# Patient Record
Sex: MALE | URBAN - METROPOLITAN AREA
[De-identification: names, ages, dates, MRNs, and addresses within clinical notes are randomized per-mention and may not be internally consistent; named-entity substitution may affect disease eponyms.]

---

## 2017-07-28 ENCOUNTER — OUTPATIENT (OUTPATIENT)
Dept: OUTPATIENT SERVICES | Facility: HOSPITAL | Age: 57
LOS: 1 days | Discharge: HOME | End: 2017-07-28
Payer: COMMERCIAL

## 2017-07-28 VITALS
HEIGHT: 74 IN | SYSTOLIC BLOOD PRESSURE: 150 MMHG | TEMPERATURE: 98 F | DIASTOLIC BLOOD PRESSURE: 92 MMHG | HEART RATE: 68 BPM | RESPIRATION RATE: 15 BRPM | OXYGEN SATURATION: 100 % | WEIGHT: 276.02 LBS

## 2017-07-28 DIAGNOSIS — Z98.890 OTHER SPECIFIED POSTPROCEDURAL STATES: Chronic | ICD-10-CM

## 2017-07-28 DIAGNOSIS — Z98.1 ARTHRODESIS STATUS: Chronic | ICD-10-CM

## 2017-07-28 DIAGNOSIS — M25.461 EFFUSION, RIGHT KNEE: ICD-10-CM

## 2017-07-28 LAB
ANION GAP SERPL CALC-SCNC: 8 MMOL/L — SIGNIFICANT CHANGE UP (ref 5–17)
APPEARANCE UR: CLEAR — SIGNIFICANT CHANGE UP
APTT BLD: 31.1 SEC — SIGNIFICANT CHANGE UP (ref 27.5–37.4)
BASOPHILS # BLD AUTO: 0 K/UL — SIGNIFICANT CHANGE UP (ref 0–0.2)
BASOPHILS NFR BLD AUTO: 0.7 % — SIGNIFICANT CHANGE UP (ref 0–2)
BILIRUB UR-MCNC: NEGATIVE — SIGNIFICANT CHANGE UP
BUN SERPL-MCNC: 17 MG/DL — SIGNIFICANT CHANGE UP (ref 7–23)
CALCIUM SERPL-MCNC: 9.2 MG/DL — SIGNIFICANT CHANGE UP (ref 8.5–10.1)
CHLORIDE SERPL-SCNC: 108 MMOL/L — SIGNIFICANT CHANGE UP (ref 96–108)
CO2 SERPL-SCNC: 26 MMOL/L — SIGNIFICANT CHANGE UP (ref 22–31)
COLOR SPEC: YELLOW — SIGNIFICANT CHANGE UP
CREAT SERPL-MCNC: 1.06 MG/DL — SIGNIFICANT CHANGE UP (ref 0.5–1.3)
DIFF PNL FLD: NEGATIVE — SIGNIFICANT CHANGE UP
EOSINOPHIL # BLD AUTO: 0.2 K/UL — SIGNIFICANT CHANGE UP (ref 0–0.5)
EOSINOPHIL NFR BLD AUTO: 2.7 % — SIGNIFICANT CHANGE UP (ref 0–6)
GLUCOSE SERPL-MCNC: 103 MG/DL — HIGH (ref 70–99)
GLUCOSE UR QL: NEGATIVE MG/DL — SIGNIFICANT CHANGE UP
HCT VFR BLD CALC: 39 % — SIGNIFICANT CHANGE UP (ref 39–50)
HGB BLD-MCNC: 13.1 G/DL — SIGNIFICANT CHANGE UP (ref 13–17)
INR BLD: 1.02 RATIO — SIGNIFICANT CHANGE UP (ref 0.88–1.16)
KETONES UR-MCNC: NEGATIVE — SIGNIFICANT CHANGE UP
LEUKOCYTE ESTERASE UR-ACNC: NEGATIVE — SIGNIFICANT CHANGE UP
LYMPHOCYTES # BLD AUTO: 1.8 K/UL — SIGNIFICANT CHANGE UP (ref 1–3.3)
LYMPHOCYTES # BLD AUTO: 28.1 % — SIGNIFICANT CHANGE UP (ref 13–44)
MCHC RBC-ENTMCNC: 28.3 PG — SIGNIFICANT CHANGE UP (ref 27–34)
MCHC RBC-ENTMCNC: 33.6 GM/DL — SIGNIFICANT CHANGE UP (ref 32–36)
MCV RBC AUTO: 84.2 FL — SIGNIFICANT CHANGE UP (ref 80–100)
MONOCYTES # BLD AUTO: 0.5 K/UL — SIGNIFICANT CHANGE UP (ref 0–0.9)
MONOCYTES NFR BLD AUTO: 7.7 % — SIGNIFICANT CHANGE UP (ref 2–14)
NEUTROPHILS # BLD AUTO: 3.8 K/UL — SIGNIFICANT CHANGE UP (ref 1.8–7.4)
NEUTROPHILS NFR BLD AUTO: 60.8 % — SIGNIFICANT CHANGE UP (ref 43–77)
NITRITE UR-MCNC: NEGATIVE — SIGNIFICANT CHANGE UP
PH UR: 5 — SIGNIFICANT CHANGE UP (ref 5–8)
PLATELET # BLD AUTO: 158 K/UL — SIGNIFICANT CHANGE UP (ref 150–400)
POTASSIUM SERPL-MCNC: 3.9 MMOL/L — SIGNIFICANT CHANGE UP (ref 3.5–5.3)
POTASSIUM SERPL-SCNC: 3.9 MMOL/L — SIGNIFICANT CHANGE UP (ref 3.5–5.3)
PROT UR-MCNC: NEGATIVE MG/DL — SIGNIFICANT CHANGE UP
PROTHROM AB SERPL-ACNC: 11 SEC — SIGNIFICANT CHANGE UP (ref 9.8–12.7)
RBC # BLD: 4.63 M/UL — SIGNIFICANT CHANGE UP (ref 4.2–5.8)
RBC # FLD: 13 % — SIGNIFICANT CHANGE UP (ref 10.3–14.5)
SODIUM SERPL-SCNC: 142 MMOL/L — SIGNIFICANT CHANGE UP (ref 135–145)
SP GR SPEC: 1.02 — SIGNIFICANT CHANGE UP (ref 1.01–1.02)
UROBILINOGEN FLD QL: NEGATIVE MG/DL — SIGNIFICANT CHANGE UP
WBC # BLD: 6.3 K/UL — SIGNIFICANT CHANGE UP (ref 3.8–10.5)
WBC # FLD AUTO: 6.3 K/UL — SIGNIFICANT CHANGE UP (ref 3.8–10.5)

## 2017-07-28 PROCEDURE — 93010 ELECTROCARDIOGRAM REPORT: CPT

## 2017-07-28 NOTE — H&P PST ADULT - FAMILY HISTORY
Father  Still living? No  Family history of dementia, Age at diagnosis: Age Unknown     Mother  Still living? Yes, Estimated age: 71-80  Family history of diabetes mellitus, Age at diagnosis: Age Unknown

## 2017-07-28 NOTE — H&P PST ADULT - ASSESSMENT
55 yo male scheduled for  right knee arthroscopy with Dr. Jorgensen on 8/3/17.     1. Labs as per surgeon  2. EKG  3. Medical clearance with PCP Dr Sanchez to be arranged  4. discussed EZ sponges & day of procedure instructions

## 2017-07-28 NOTE — H&P PST ADULT - PMH
Back pain    Elevated blood pressure reading    History of colon polyps    Knee pain, right    Meniscus tear  right  Obesity

## 2017-07-28 NOTE — H&P PST ADULT - PSH
H/O neck surgery  anterior cervical fusion 2010  S/P colonoscopy    S/P foot surgery, right    S/P lumbar fusion  2013

## 2017-08-02 RX ORDER — OXYCODONE HYDROCHLORIDE 5 MG/1
5 TABLET ORAL EVERY 4 HOURS
Qty: 0 | Refills: 0 | Status: DISCONTINUED | OUTPATIENT
Start: 2017-08-03 | End: 2017-08-03

## 2017-08-02 RX ORDER — FENTANYL CITRATE 50 UG/ML
50 INJECTION INTRAVENOUS
Qty: 0 | Refills: 0 | Status: DISCONTINUED | OUTPATIENT
Start: 2017-08-03 | End: 2017-08-03

## 2017-08-02 RX ORDER — ACETAMINOPHEN 500 MG
975 TABLET ORAL ONCE
Qty: 0 | Refills: 0 | Status: COMPLETED | OUTPATIENT
Start: 2017-08-03 | End: 2017-08-03

## 2017-08-02 RX ORDER — FAMOTIDINE 10 MG/ML
20 INJECTION INTRAVENOUS ONCE
Qty: 0 | Refills: 0 | Status: COMPLETED | OUTPATIENT
Start: 2017-08-03 | End: 2017-08-03

## 2017-08-02 RX ORDER — OXYCODONE HYDROCHLORIDE 5 MG/1
10 TABLET ORAL ONCE
Qty: 0 | Refills: 0 | Status: DISCONTINUED | OUTPATIENT
Start: 2017-08-03 | End: 2017-08-03

## 2017-08-03 ENCOUNTER — OUTPATIENT (OUTPATIENT)
Dept: OUTPATIENT SERVICES | Facility: HOSPITAL | Age: 57
LOS: 1 days | Discharge: HOME | End: 2017-08-03
Payer: COMMERCIAL

## 2017-08-03 ENCOUNTER — RESULT REVIEW (OUTPATIENT)
Age: 57
End: 2017-08-03

## 2017-08-03 VITALS
TEMPERATURE: 97 F | WEIGHT: 270.07 LBS | HEIGHT: 74 IN | HEART RATE: 73 BPM | OXYGEN SATURATION: 97 % | SYSTOLIC BLOOD PRESSURE: 136 MMHG | DIASTOLIC BLOOD PRESSURE: 96 MMHG | RESPIRATION RATE: 16 BRPM

## 2017-08-03 VITALS
DIASTOLIC BLOOD PRESSURE: 88 MMHG | TEMPERATURE: 98 F | HEART RATE: 69 BPM | SYSTOLIC BLOOD PRESSURE: 140 MMHG | OXYGEN SATURATION: 95 % | RESPIRATION RATE: 16 BRPM

## 2017-08-03 DIAGNOSIS — Z98.890 OTHER SPECIFIED POSTPROCEDURAL STATES: Chronic | ICD-10-CM

## 2017-08-03 DIAGNOSIS — Z98.1 ARTHRODESIS STATUS: Chronic | ICD-10-CM

## 2017-08-03 PROCEDURE — 88304 TISSUE EXAM BY PATHOLOGIST: CPT | Mod: 26

## 2017-08-03 RX ORDER — OXYCODONE HYDROCHLORIDE 5 MG/1
1 TABLET ORAL
Qty: 30 | Refills: 0
Start: 2017-08-03

## 2017-08-03 RX ORDER — ASPIRIN/CALCIUM CARB/MAGNESIUM 324 MG
1 TABLET ORAL
Qty: 30 | Refills: 0
Start: 2017-08-03 | End: 2017-09-02

## 2017-08-03 RX ORDER — SODIUM CHLORIDE 9 MG/ML
1000 INJECTION, SOLUTION INTRAVENOUS
Qty: 0 | Refills: 0 | Status: DISCONTINUED | OUTPATIENT
Start: 2017-08-03 | End: 2017-08-03

## 2017-08-03 RX ORDER — ONDANSETRON 8 MG/1
4 TABLET, FILM COATED ORAL ONCE
Qty: 0 | Refills: 0 | Status: DISCONTINUED | OUTPATIENT
Start: 2017-08-03 | End: 2017-08-03

## 2017-08-03 RX ADMIN — FAMOTIDINE 20 MILLIGRAM(S): 10 INJECTION INTRAVENOUS at 11:23

## 2017-08-03 RX ADMIN — Medication 975 MILLIGRAM(S): at 11:23

## 2017-08-03 RX ADMIN — FENTANYL CITRATE 50 MICROGRAM(S): 50 INJECTION INTRAVENOUS at 13:21

## 2017-08-03 RX ADMIN — OXYCODONE HYDROCHLORIDE 5 MILLIGRAM(S): 5 TABLET ORAL at 13:22

## 2017-08-03 RX ADMIN — OXYCODONE HYDROCHLORIDE 10 MILLIGRAM(S): 5 TABLET ORAL at 11:23

## 2017-08-03 RX ADMIN — FENTANYL CITRATE 50 MICROGRAM(S): 50 INJECTION INTRAVENOUS at 13:26

## 2017-08-03 RX ADMIN — FENTANYL CITRATE 50 MICROGRAM(S): 50 INJECTION INTRAVENOUS at 13:07

## 2017-08-03 RX ADMIN — FENTANYL CITRATE 50 MICROGRAM(S): 50 INJECTION INTRAVENOUS at 13:00

## 2017-08-03 NOTE — ASU DISCHARGE PLAN (ADULT/PEDIATRIC). - ACTIVITY LEVEL
no exercise/weight bearing as tolerated/Weight bearing as tolerated Right Lower Extremity with assistive devices as needed/no tub baths/no sports/gym/elevate extremity/no heavy lifting/quiet play

## 2017-08-03 NOTE — BRIEF OPERATIVE NOTE - PROCEDURE
Knee arthroscopy, right  08/03/2017  Right Knee Arthroscopy, Partial Medial Menisectomy, Patellofemoral Chondroplasty  Active  [id number]

## 2017-08-03 NOTE — ASU DISCHARGE PLAN (ADULT/PEDIATRIC). - MEDICATION SUMMARY - MEDICATIONS TO STOP TAKING
I will STOP taking the medications listed below when I get home from the hospital:    oxycodone-acetaminophen 10mg-325mg oral tablet  -- 1 tab(s) by mouth every 6 hours, As Needed

## 2017-08-03 NOTE — ASU DISCHARGE PLAN (ADULT/PEDIATRIC). - MEDICATION SUMMARY - MEDICATIONS TO TAKE
I will START or STAY ON the medications listed below when I get home from the hospital:    Ecotrin 325 mg oral delayed release tablet  -- 1 tab(s) by mouth once a day for 4 weeks for DVT ppx  -- Swallow whole.  Do not crush.  Take with food or milk.    -- Indication: For DVT ppx    acetaminophen-oxyCODONE 325 mg-5 mg oral tablet  -- 1 tab(s) by mouth every 4 hours, As Needed MDD:6 - for severe pain  -- Caution federal law prohibits the transfer of this drug to any person other  than the person for whom it was prescribed.  May cause drowsiness.  Alcohol may intensify this effect.  Use care when operating dangerous machinery.  This prescription cannot be refilled.  This product contains acetaminophen.  Do not use  with any other product containing acetaminophen to prevent possible liver damage.  Using more of this medication than prescribed may cause serious breathing problems.    -- Indication: For Severe pain

## 2017-08-03 NOTE — ASU DISCHARGE PLAN (ADULT/PEDIATRIC). - INSTRUCTIONS
Resume normal diet. Please avoid alcohol when taking pain medication FU in 10-14 days. Please call for appt.

## 2017-08-03 NOTE — ASU DISCHARGE PLAN (ADULT/PEDIATRIC). - NOTIFY
Bleeding that does not stop/Pain not relieved by Medications/Numbness, color, or temperature change to extremity/Fever greater than 101/Swelling that continues/Numbness, tingling

## 2017-08-07 DIAGNOSIS — M25.461 EFFUSION, RIGHT KNEE: ICD-10-CM

## 2017-08-07 DIAGNOSIS — M22.41 CHONDROMALACIA PATELLAE, RIGHT KNEE: ICD-10-CM

## 2017-08-07 DIAGNOSIS — M23.221 DERANGEMENT OF POSTERIOR HORN OF MEDIAL MENISCUS DUE TO OLD TEAR OR INJURY, RIGHT KNEE: ICD-10-CM

## 2017-08-07 DIAGNOSIS — M25.561 PAIN IN RIGHT KNEE: ICD-10-CM

## 2017-08-07 DIAGNOSIS — M79.661 PAIN IN RIGHT LOWER LEG: ICD-10-CM

## 2017-08-07 DIAGNOSIS — M23.8X1 OTHER INTERNAL DERANGEMENTS OF RIGHT KNEE: ICD-10-CM

## 2017-08-07 LAB — SURGICAL PATHOLOGY FINAL REPORT - CH: SIGNIFICANT CHANGE UP

## 2018-07-16 PROBLEM — S83.209A UNSPECIFIED TEAR OF UNSPECIFIED MENISCUS, CURRENT INJURY, UNSPECIFIED KNEE, INITIAL ENCOUNTER: Chronic | Status: ACTIVE | Noted: 2017-07-28

## 2020-06-16 NOTE — H&P PST ADULT - CARDIOVASCULAR
Spoke to patient in regards to thyroid being normal, liver function being somewhat elevated and patient agreed to ultrasound. He will avoid tylenol and alcohol to protect liver.  Patient also has anemia and will check B12 folate levels.    Patient expressed verbal understanding with no further questions.      negative Regular rate & rhythm, normal S1, S2; no murmurs, gallops or rubs; no S3, S4 details…

## 2020-10-08 NOTE — ASU DISCHARGE PLAN (ADULT/PEDIATRIC). - NURSING INSTRUCTIONS
Begin with liquids and light food ( tea, toast, Jello, soups). Advance to what you normally eat. Liquids should taken in adequate amounts today.
Carbon monoxide exposure

## 2020-11-30 ENCOUNTER — NON-APPOINTMENT (OUTPATIENT)
Age: 60
End: 2020-11-30

## 2020-11-30 DIAGNOSIS — Z82.61 FAMILY HISTORY OF ARTHRITIS: ICD-10-CM

## 2020-11-30 DIAGNOSIS — M48.061 SPINAL STENOSIS, LUMBAR REGION WITHOUT NEUROGENIC CLAUDICATION: ICD-10-CM

## 2020-11-30 DIAGNOSIS — M43.16 SPONDYLOLISTHESIS, LUMBAR REGION: ICD-10-CM

## 2020-11-30 DIAGNOSIS — Z87.2 PERSONAL HISTORY OF DISEASES OF THE SKIN AND SUBCUTANEOUS TISSUE: ICD-10-CM

## 2020-11-30 DIAGNOSIS — F41.9 ANXIETY DISORDER, UNSPECIFIED: ICD-10-CM

## 2020-11-30 DIAGNOSIS — Z86.19 PERSONAL HISTORY OF OTHER INFECTIOUS AND PARASITIC DISEASES: ICD-10-CM

## 2020-11-30 DIAGNOSIS — Z87.891 PERSONAL HISTORY OF NICOTINE DEPENDENCE: ICD-10-CM

## 2020-11-30 DIAGNOSIS — Z86.018 PERSONAL HISTORY OF OTHER BENIGN NEOPLASM: ICD-10-CM

## 2020-11-30 DIAGNOSIS — Z81.8 FAMILY HISTORY OF OTHER MENTAL AND BEHAVIORAL DISORDERS: ICD-10-CM

## 2020-11-30 DIAGNOSIS — N43.40 SPERMATOCELE OF EPIDIDYMIS, UNSPECIFIED: ICD-10-CM

## 2020-11-30 DIAGNOSIS — K21.9 GASTRO-ESOPHAGEAL REFLUX DISEASE W/OUT ESOPHAGITIS: ICD-10-CM

## 2020-11-30 DIAGNOSIS — Z82.69 FAMILY HISTORY OF OTHER DISEASES OF THE MUSCULOSKELETAL SYSTEM AND CONNECTIVE TISSUE: ICD-10-CM

## 2020-11-30 DIAGNOSIS — K62.1 RECTAL POLYP: ICD-10-CM

## 2020-11-30 DIAGNOSIS — I83.11 VARICOSE VEINS OF RIGHT LOWER EXTREMITY WITH INFLAMMATION: ICD-10-CM

## 2020-11-30 DIAGNOSIS — Z78.9 OTHER SPECIFIED HEALTH STATUS: ICD-10-CM

## 2020-11-30 DIAGNOSIS — Z83.3 FAMILY HISTORY OF DIABETES MELLITUS: ICD-10-CM

## 2020-11-30 DIAGNOSIS — I83.12 VARICOSE VEINS OF RIGHT LOWER EXTREMITY WITH INFLAMMATION: ICD-10-CM

## 2021-03-15 PROBLEM — R03.0 ELEVATED BLOOD-PRESSURE READING, WITHOUT DIAGNOSIS OF HYPERTENSION: Chronic | Status: ACTIVE | Noted: 2017-07-28

## 2021-03-15 PROBLEM — M25.561 PAIN IN RIGHT KNEE: Chronic | Status: ACTIVE | Noted: 2017-07-28

## 2021-03-15 PROBLEM — M54.9 DORSALGIA, UNSPECIFIED: Chronic | Status: ACTIVE | Noted: 2017-07-28

## 2021-03-15 PROBLEM — Z86.010 PERSONAL HISTORY OF COLONIC POLYPS: Chronic | Status: ACTIVE | Noted: 2017-07-28

## 2021-03-15 PROBLEM — E66.9 OBESITY, UNSPECIFIED: Chronic | Status: ACTIVE | Noted: 2017-07-28

## 2021-03-17 ENCOUNTER — APPOINTMENT (OUTPATIENT)
Dept: INTERNAL MEDICINE | Facility: CLINIC | Age: 61
End: 2021-03-17
Payer: COMMERCIAL

## 2021-03-17 VITALS
SYSTOLIC BLOOD PRESSURE: 120 MMHG | BODY MASS INDEX: 33.37 KG/M2 | WEIGHT: 260 LBS | DIASTOLIC BLOOD PRESSURE: 80 MMHG | HEIGHT: 74 IN

## 2021-03-17 DIAGNOSIS — M51.9 UNSPECIFIED THORACIC, THORACOLUMBAR AND LUMBOSACRAL INTERVERTEBRAL DISC DISORDER: ICD-10-CM

## 2021-03-17 DIAGNOSIS — N40.1 BENIGN PROSTATIC HYPERPLASIA WITH LOWER URINARY TRACT SYMPMS: ICD-10-CM

## 2021-03-17 DIAGNOSIS — R35.0 BENIGN PROSTATIC HYPERPLASIA WITH LOWER URINARY TRACT SYMPMS: ICD-10-CM

## 2021-03-17 PROCEDURE — 36415 COLL VENOUS BLD VENIPUNCTURE: CPT

## 2021-03-17 PROCEDURE — 99396 PREV VISIT EST AGE 40-64: CPT | Mod: 25

## 2021-03-17 PROCEDURE — 99072 ADDL SUPL MATRL&STAF TM PHE: CPT

## 2021-03-17 NOTE — PLAN
[FreeTextEntry1] : His exam is unremarkable except for his weight at 260 pounds.  He states he has lost 20 pounds over the past couple months and is going to continue with a weight loss program.\par Fasting labs including thyroid function, A1c and PSA was sent.  Also asking for Covid antibody test to be done.\par History of ED–Cialis 10 mg #90 was renewed.\par History of lumbar disc disease with spasm continues to follow-up with his pain specialist.\par History of intermittent cervical spasm–he has used Medrol 4 mg on an as-needed basis in the past and this was renewed.\par He had a normal colonoscopy in 2014\par Cardiac evaluation–asymptomatic

## 2021-03-17 NOTE — HISTORY OF PRESENT ILLNESS
[de-identified] : 59 y/o male presents for annual wellness visit as well as for follow-up fasting labs.\par He has been generally well without any recent illness.\par He has ongoing issues with neck and back pain and sees a pain specialist on a regular basis.  He recently had epidural injection with good results.\par He has no specific symptoms but is asking about going for a cardiac evaluation

## 2021-03-17 NOTE — REVIEW OF SYSTEMS
[Joint Pain] : joint pain [Joint Stiffness] : joint stiffness [Back Pain] : back pain [Negative] : Heme/Lymph [Fever] : no fever [Chills] : no chills [Chest Pain] : no chest pain [Palpitations] : no palpitations [Lower Ext Edema] : no lower extremity edema [Shortness Of Breath] : no shortness of breath [Abdominal Pain] : no abdominal pain [Diarrhea] : no diarrhea [Muscle Weakness] : no muscle weakness [Joint Swelling] : no joint swelling [Headache] : no headache [Dizziness] : no dizziness [Fainting] : no fainting

## 2021-03-17 NOTE — PHYSICAL EXAM
[No JVD] : no jugular venous distention [No Lymphadenopathy] : no lymphadenopathy [Clear to Auscultation] : lungs were clear to auscultation bilaterally [No Carotid Bruits] : no carotid bruits [No Edema] : there was no peripheral edema [Non Tender] : non-tender [No Joint Swelling] : no joint swelling [No Focal Deficits] : no focal deficits [Normal] : affect was normal and insight and judgment were intact

## 2021-03-18 LAB
ALBUMIN SERPL ELPH-MCNC: 4.7 G/DL
ALP BLD-CCNC: 63 U/L
ALT SERPL-CCNC: 24 U/L
ANION GAP SERPL CALC-SCNC: 12 MMOL/L
AST SERPL-CCNC: 22 U/L
BASOPHILS # BLD AUTO: 0.02 K/UL
BASOPHILS NFR BLD AUTO: 0.3 %
BILIRUB SERPL-MCNC: 0.7 MG/DL
BUN SERPL-MCNC: 21 MG/DL
CALCIUM SERPL-MCNC: 9.8 MG/DL
CHLORIDE SERPL-SCNC: 105 MMOL/L
CHOLEST SERPL-MCNC: 162 MG/DL
CO2 SERPL-SCNC: 25 MMOL/L
COVID-19 NUCLEOCAPSID  GAM ANTIBODY INTERPRETATION: NEGATIVE
CREAT SERPL-MCNC: 1.01 MG/DL
EOSINOPHIL # BLD AUTO: 0.1 K/UL
EOSINOPHIL NFR BLD AUTO: 1.7 %
ESTIMATED AVERAGE GLUCOSE: 114 MG/DL
GLUCOSE SERPL-MCNC: 113 MG/DL
HBA1C MFR BLD HPLC: 5.6 %
HCT VFR BLD CALC: 42.4 %
HDLC SERPL-MCNC: 42 MG/DL
HGB BLD-MCNC: 13.4 G/DL
IMM GRANULOCYTES NFR BLD AUTO: 0.2 %
LDLC SERPL CALC-MCNC: 101 MG/DL
LYMPHOCYTES # BLD AUTO: 1.05 K/UL
LYMPHOCYTES NFR BLD AUTO: 17.4 %
MAN DIFF?: NORMAL
MCHC RBC-ENTMCNC: 27.7 PG
MCHC RBC-ENTMCNC: 31.6 GM/DL
MCV RBC AUTO: 87.8 FL
MONOCYTES # BLD AUTO: 0.41 K/UL
MONOCYTES NFR BLD AUTO: 6.8 %
NEUTROPHILS # BLD AUTO: 4.44 K/UL
NEUTROPHILS NFR BLD AUTO: 73.6 %
NONHDLC SERPL-MCNC: 121 MG/DL
PLATELET # BLD AUTO: 127 K/UL
POTASSIUM SERPL-SCNC: 4.2 MMOL/L
PROT SERPL-MCNC: 7 G/DL
RBC # BLD: 4.83 M/UL
RBC # FLD: 13.6 %
SARS-COV-2 AB SERPL QL IA: 0.07 INDEX
SODIUM SERPL-SCNC: 142 MMOL/L
TRIGL SERPL-MCNC: 98 MG/DL
WBC # FLD AUTO: 6.03 K/UL

## 2021-03-20 LAB
FT4I SERPL CALC-MCNC: 5.6 INDEX
PSA SERPL-MCNC: 0.97 NG/ML
T3 SERPL-MCNC: 113 NG/DL
T4 SERPL-MCNC: 6.2 UG/DL
TSH SERPL-ACNC: 0.82 UIU/ML

## 2021-12-14 ENCOUNTER — APPOINTMENT (OUTPATIENT)
Dept: INTERNAL MEDICINE | Facility: CLINIC | Age: 61
End: 2021-12-14
Payer: COMMERCIAL

## 2021-12-14 VITALS
SYSTOLIC BLOOD PRESSURE: 110 MMHG | WEIGHT: 260 LBS | BODY MASS INDEX: 33.37 KG/M2 | DIASTOLIC BLOOD PRESSURE: 70 MMHG | HEIGHT: 74 IN

## 2021-12-14 DIAGNOSIS — E86.0 DEHYDRATION: ICD-10-CM

## 2021-12-14 DIAGNOSIS — J40 BRONCHITIS, NOT SPECIFIED AS ACUTE OR CHRONIC: ICD-10-CM

## 2021-12-14 PROCEDURE — 99213 OFFICE O/P EST LOW 20 MIN: CPT | Mod: 25

## 2021-12-14 PROCEDURE — 36415 COLL VENOUS BLD VENIPUNCTURE: CPT

## 2021-12-14 RX ORDER — AZITHROMYCIN 500 MG/1
0 TABLET, FILM COATED ORAL
Qty: 0 | Refills: 0 | DISCHARGE
Start: 2021-12-14 | End: 2021-12-18

## 2021-12-14 NOTE — HEALTH RISK ASSESSMENT
[Former] : Former [10-14] : 10-14 [2 - 4 times a month (2 pts)] : 2-4 times a month (2 points) [1 or 2 (0 pts)] : 1 or 2 (0 points) [Never (0 pts)] : Never (0 points) [No] : In the past 12 months have you used drugs other than those required for medical reasons? No [0] : 2) Feeling down, depressed, or hopeless: Not at all (0) [PHQ-2 Negative - No further assessment needed] : PHQ-2 Negative - No further assessment needed [YearQuit] : 2008 [Audit-CScore] : 2 [[id number]] : 0

## 2021-12-14 NOTE — ASSESSMENT
[FreeTextEntry1] : Recent Covid/bronchitis–he is 2 weeks out since being diagnosed with Covid.  He is still symptomatic mostly with malaise and fatigue along with cough and congestion.  He was told his lungs are clear and his oximetry is acceptable at 93%.  He is presently on Solu-Medrol and was told to take 60 mg daily for the next 5 days.  Is also given Zithromax Z-Rodrigo to take over the next 5 days as well.  He was encouraged to hydrate better and also to eat but try to keep his diet very light for the time being.  He was told if he has any significant worsening in his respiratory status he needs to go to the emergency room but he was told at this point it is unlikely he should get significantly worse.\par Follow-up labs were sent as well.

## 2021-12-14 NOTE — PHYSICAL EXAM
[No Acute Distress] : no acute distress [No JVD] : no jugular venous distention [No Lymphadenopathy] : no lymphadenopathy [No Respiratory Distress] : no respiratory distress  [Clear to Auscultation] : lungs were clear to auscultation bilaterally [Normal] : normal rate, regular rhythm, normal S1 and S2 and no murmur heard [No Edema] : there was no peripheral edema

## 2021-12-14 NOTE — REVIEW OF SYSTEMS
[Fever] : fever [Chills] : chills [Fatigue] : fatigue [Recent Change In Weight] : ~T recent weight change [Shortness Of Breath] : shortness of breath [Cough] : cough [Dyspnea on Exertion] : dyspnea on exertion [Chest Pain] : no chest pain [Palpitations] : no palpitations [Wheezing] : no wheezing [Abdominal Pain] : no abdominal pain [Diarrhea] : no diarrhea [Dysuria] : no dysuria [Muscle Weakness] : no muscle weakness [Joint Swelling] : no joint swelling [Skin Rash] : no skin rash [Headache] : no headache [Dizziness] : no dizziness [Fainting] : no fainting

## 2021-12-14 NOTE — HISTORY OF PRESENT ILLNESS
[de-identified] : 62 y/o presents for follow up and fasting labs.\par He has no significant past medical history other than chronic back pain/arthritis.\par He tested positive for Covid about 2 weeks ago and complains of persistent feelings of malaise and fatigue along with dry cough some shortness of breath and intermittent fevers.  He has been trying to hydrate well and has been able to eat somewhat.

## 2021-12-15 LAB
ALBUMIN SERPL ELPH-MCNC: 4.3 G/DL
ALP BLD-CCNC: 54 U/L
ALT SERPL-CCNC: 20 U/L
ANION GAP SERPL CALC-SCNC: 15 MMOL/L
AST SERPL-CCNC: 30 U/L
BASOPHILS # BLD AUTO: 0.01 K/UL
BASOPHILS NFR BLD AUTO: 0.2 %
BILIRUB SERPL-MCNC: 0.6 MG/DL
BUN SERPL-MCNC: 21 MG/DL
CALCIUM SERPL-MCNC: 9.4 MG/DL
CHLORIDE SERPL-SCNC: 101 MMOL/L
CO2 SERPL-SCNC: 23 MMOL/L
CREAT SERPL-MCNC: 1.16 MG/DL
EOSINOPHIL # BLD AUTO: 0 K/UL
EOSINOPHIL NFR BLD AUTO: 0 %
GLUCOSE SERPL-MCNC: 117 MG/DL
HCT VFR BLD CALC: 42 %
HGB BLD-MCNC: 13.5 G/DL
IMM GRANULOCYTES NFR BLD AUTO: 0.4 %
LYMPHOCYTES # BLD AUTO: 0.65 K/UL
LYMPHOCYTES NFR BLD AUTO: 14.1 %
MAN DIFF?: NORMAL
MCHC RBC-ENTMCNC: 27.7 PG
MCHC RBC-ENTMCNC: 32.1 GM/DL
MCV RBC AUTO: 86.1 FL
MONOCYTES # BLD AUTO: 0.3 K/UL
MONOCYTES NFR BLD AUTO: 6.5 %
NEUTROPHILS # BLD AUTO: 3.62 K/UL
NEUTROPHILS NFR BLD AUTO: 78.8 %
PLATELET # BLD AUTO: 123 K/UL
POTASSIUM SERPL-SCNC: 4.6 MMOL/L
PROT SERPL-MCNC: 7 G/DL
RBC # BLD: 4.88 M/UL
RBC # FLD: 13.4 %
SODIUM SERPL-SCNC: 139 MMOL/L
WBC # FLD AUTO: 4.6 K/UL

## 2021-12-16 ENCOUNTER — INPATIENT (INPATIENT)
Facility: HOSPITAL | Age: 61
LOS: 3 days | Discharge: HOME | DRG: 177 | End: 2021-12-20
Attending: INTERNAL MEDICINE | Admitting: INTERNAL MEDICINE
Payer: COMMERCIAL

## 2021-12-16 VITALS — WEIGHT: 246.04 LBS | HEIGHT: 74 IN

## 2021-12-16 DIAGNOSIS — Z98.890 OTHER SPECIFIED POSTPROCEDURAL STATES: Chronic | ICD-10-CM

## 2021-12-16 DIAGNOSIS — U07.1 COVID-19: ICD-10-CM

## 2021-12-16 DIAGNOSIS — Z98.1 ARTHRODESIS STATUS: Chronic | ICD-10-CM

## 2021-12-16 LAB
ALBUMIN SERPL ELPH-MCNC: 3.1 G/DL — LOW (ref 3.3–5)
ALP SERPL-CCNC: 49 U/L — SIGNIFICANT CHANGE UP (ref 40–120)
ALT FLD-CCNC: 28 U/L — SIGNIFICANT CHANGE UP (ref 12–78)
ANION GAP SERPL CALC-SCNC: 5 MMOL/L — SIGNIFICANT CHANGE UP (ref 5–17)
AST SERPL-CCNC: 32 U/L — SIGNIFICANT CHANGE UP (ref 15–37)
BASOPHILS # BLD AUTO: 0.01 K/UL — SIGNIFICANT CHANGE UP (ref 0–0.2)
BASOPHILS NFR BLD AUTO: 0.2 % — SIGNIFICANT CHANGE UP (ref 0–2)
BILIRUB SERPL-MCNC: 0.7 MG/DL — SIGNIFICANT CHANGE UP (ref 0.2–1.2)
BUN SERPL-MCNC: 22 MG/DL — SIGNIFICANT CHANGE UP (ref 7–23)
CALCIUM SERPL-MCNC: 9.5 MG/DL — SIGNIFICANT CHANGE UP (ref 8.5–10.1)
CHLORIDE SERPL-SCNC: 107 MMOL/L — SIGNIFICANT CHANGE UP (ref 96–108)
CO2 SERPL-SCNC: 27 MMOL/L — SIGNIFICANT CHANGE UP (ref 22–31)
CREAT SERPL-MCNC: 1.08 MG/DL — SIGNIFICANT CHANGE UP (ref 0.5–1.3)
D DIMER BLD IA.RAPID-MCNC: 164 NG/ML DDU — SIGNIFICANT CHANGE UP
EOSINOPHIL # BLD AUTO: 0.02 K/UL — SIGNIFICANT CHANGE UP (ref 0–0.5)
EOSINOPHIL NFR BLD AUTO: 0.3 % — SIGNIFICANT CHANGE UP (ref 0–6)
GLUCOSE SERPL-MCNC: 109 MG/DL — HIGH (ref 70–99)
HCT VFR BLD CALC: 41.9 % — SIGNIFICANT CHANGE UP (ref 39–50)
HGB BLD-MCNC: 13.7 G/DL — SIGNIFICANT CHANGE UP (ref 13–17)
IMM GRANULOCYTES NFR BLD AUTO: 0.7 % — SIGNIFICANT CHANGE UP (ref 0–1.5)
LACTATE SERPL-SCNC: 1.4 MMOL/L — SIGNIFICANT CHANGE UP (ref 0.7–2)
LYMPHOCYTES # BLD AUTO: 0.88 K/UL — LOW (ref 1–3.3)
LYMPHOCYTES # BLD AUTO: 15.3 % — SIGNIFICANT CHANGE UP (ref 13–44)
MCHC RBC-ENTMCNC: 27.8 PG — SIGNIFICANT CHANGE UP (ref 27–34)
MCHC RBC-ENTMCNC: 32.7 GM/DL — SIGNIFICANT CHANGE UP (ref 32–36)
MCV RBC AUTO: 85 FL — SIGNIFICANT CHANGE UP (ref 80–100)
MONOCYTES # BLD AUTO: 0.39 K/UL — SIGNIFICANT CHANGE UP (ref 0–0.9)
MONOCYTES NFR BLD AUTO: 6.8 % — SIGNIFICANT CHANGE UP (ref 2–14)
NEUTROPHILS # BLD AUTO: 4.42 K/UL — SIGNIFICANT CHANGE UP (ref 1.8–7.4)
NEUTROPHILS NFR BLD AUTO: 76.7 % — SIGNIFICANT CHANGE UP (ref 43–77)
PLATELET # BLD AUTO: 168 K/UL — SIGNIFICANT CHANGE UP (ref 150–400)
POTASSIUM SERPL-MCNC: 4.1 MMOL/L — SIGNIFICANT CHANGE UP (ref 3.5–5.3)
POTASSIUM SERPL-SCNC: 4.1 MMOL/L — SIGNIFICANT CHANGE UP (ref 3.5–5.3)
PROT SERPL-MCNC: 7.7 GM/DL — SIGNIFICANT CHANGE UP (ref 6–8.3)
RBC # BLD: 4.93 M/UL — SIGNIFICANT CHANGE UP (ref 4.2–5.8)
RBC # FLD: 13.2 % — SIGNIFICANT CHANGE UP (ref 10.3–14.5)
SARS-COV-2 RNA SPEC QL NAA+PROBE: DETECTED
SODIUM SERPL-SCNC: 139 MMOL/L — SIGNIFICANT CHANGE UP (ref 135–145)
TROPONIN I, HIGH SENSITIVITY RESULT: 7.21 NG/L — SIGNIFICANT CHANGE UP
WBC # BLD: 5.76 K/UL — SIGNIFICANT CHANGE UP (ref 3.8–10.5)
WBC # FLD AUTO: 5.76 K/UL — SIGNIFICANT CHANGE UP (ref 3.8–10.5)

## 2021-12-16 PROCEDURE — 83036 HEMOGLOBIN GLYCOSYLATED A1C: CPT

## 2021-12-16 PROCEDURE — C9399: CPT

## 2021-12-16 PROCEDURE — 36415 COLL VENOUS BLD VENIPUNCTURE: CPT

## 2021-12-16 PROCEDURE — 86803 HEPATITIS C AB TEST: CPT

## 2021-12-16 PROCEDURE — 80048 BASIC METABOLIC PNL TOTAL CA: CPT

## 2021-12-16 PROCEDURE — 82550 ASSAY OF CK (CPK): CPT

## 2021-12-16 PROCEDURE — 93010 ELECTROCARDIOGRAM REPORT: CPT

## 2021-12-16 PROCEDURE — 86140 C-REACTIVE PROTEIN: CPT

## 2021-12-16 PROCEDURE — 99285 EMERGENCY DEPT VISIT HI MDM: CPT

## 2021-12-16 PROCEDURE — 83615 LACTATE (LD) (LDH) ENZYME: CPT

## 2021-12-16 PROCEDURE — 85027 COMPLETE CBC AUTOMATED: CPT

## 2021-12-16 PROCEDURE — 80053 COMPREHEN METABOLIC PANEL: CPT

## 2021-12-16 PROCEDURE — 99222 1ST HOSP IP/OBS MODERATE 55: CPT

## 2021-12-16 PROCEDURE — 71045 X-RAY EXAM CHEST 1 VIEW: CPT | Mod: 26

## 2021-12-16 PROCEDURE — 82728 ASSAY OF FERRITIN: CPT

## 2021-12-16 RX ORDER — GUAIFENESIN/DEXTROMETHORPHAN 600MG-30MG
10 TABLET, EXTENDED RELEASE 12 HR ORAL EVERY 4 HOURS
Refills: 0 | Status: DISCONTINUED | OUTPATIENT
Start: 2021-12-16 | End: 2021-12-20

## 2021-12-16 RX ORDER — ENOXAPARIN SODIUM 100 MG/ML
40 INJECTION SUBCUTANEOUS DAILY
Refills: 0 | Status: DISCONTINUED | OUTPATIENT
Start: 2021-12-16 | End: 2021-12-17

## 2021-12-16 RX ORDER — DEXAMETHASONE 0.5 MG/5ML
6 ELIXIR ORAL ONCE
Refills: 0 | Status: COMPLETED | OUTPATIENT
Start: 2021-12-16 | End: 2021-12-16

## 2021-12-16 RX ORDER — REMDESIVIR 5 MG/ML
200 INJECTION INTRAVENOUS EVERY 24 HOURS
Refills: 0 | Status: COMPLETED | OUTPATIENT
Start: 2021-12-16 | End: 2021-12-16

## 2021-12-16 RX ORDER — LANOLIN ALCOHOL/MO/W.PET/CERES
3 CREAM (GRAM) TOPICAL AT BEDTIME
Refills: 0 | Status: DISCONTINUED | OUTPATIENT
Start: 2021-12-16 | End: 2021-12-18

## 2021-12-16 RX ORDER — OXYCODONE AND ACETAMINOPHEN 5; 325 MG/1; MG/1
2 TABLET ORAL EVERY 8 HOURS
Refills: 0 | Status: DISCONTINUED | OUTPATIENT
Start: 2021-12-16 | End: 2021-12-20

## 2021-12-16 RX ORDER — REMDESIVIR 5 MG/ML
INJECTION INTRAVENOUS
Refills: 0 | Status: COMPLETED | OUTPATIENT
Start: 2021-12-16 | End: 2021-12-20

## 2021-12-16 RX ORDER — DEXAMETHASONE 0.5 MG/5ML
6 ELIXIR ORAL ONCE
Refills: 0 | Status: DISCONTINUED | OUTPATIENT
Start: 2021-12-16 | End: 2021-12-16

## 2021-12-16 RX ORDER — ONDANSETRON 8 MG/1
4 TABLET, FILM COATED ORAL EVERY 6 HOURS
Refills: 0 | Status: DISCONTINUED | OUTPATIENT
Start: 2021-12-16 | End: 2021-12-20

## 2021-12-16 RX ORDER — REMDESIVIR 5 MG/ML
100 INJECTION INTRAVENOUS EVERY 24 HOURS
Refills: 0 | Status: COMPLETED | OUTPATIENT
Start: 2021-12-17 | End: 2021-12-20

## 2021-12-16 RX ORDER — ALBUTEROL 90 UG/1
2 AEROSOL, METERED ORAL EVERY 4 HOURS
Refills: 0 | Status: DISCONTINUED | OUTPATIENT
Start: 2021-12-16 | End: 2021-12-20

## 2021-12-16 RX ORDER — DEXAMETHASONE 0.5 MG/5ML
6 ELIXIR ORAL DAILY
Refills: 0 | Status: DISCONTINUED | OUTPATIENT
Start: 2021-12-16 | End: 2021-12-20

## 2021-12-16 RX ADMIN — Medication 6 MILLIGRAM(S): at 17:50

## 2021-12-16 RX ADMIN — REMDESIVIR 500 MILLIGRAM(S): 5 INJECTION INTRAVENOUS at 18:56

## 2021-12-16 NOTE — H&P ADULT - ASSESSMENT
A/P:    1.  #Viral Pneumonia secondary to Novel Coronavirus Infection    - Maintain on airborne isolation.  - Continue with O2 as needed via nasal cannula and up-titrate as needed. If on non-rebreather mask, start continuous oximetry monitoring.  - Obtain daily room air O2 saturations once O2 requirements stabilize.  - Acetaminophen 650 mg PO q4h PRN fever. Limit use of NSAIDs.  - HFA albuterol Q6 hour PRN via MDI. Would avoid nebulized preparations to limit risk of aerosol formation.  - Started n IV Dexamethasone and Remdesevir  - Labs Testing: Daily or As Needed  - Start pharmacologic DVT PPx: Lovenox 40 mg SQ daily   - Goals of Care discussion had with patient: Patient is in full code status.    2. Protonix and DVT ppx    3.  Code status: Patient is in full code status.  A/P:    1.  #Viral Pneumonia secondary to Novel Coronavirus Infection    - Maintain on airborne isolation.  - Continue with O2 as needed via nasal cannula and up-titrate as needed. If on non-rebreather mask, start continuous oximetry monitoring.  - Obtain daily room air O2 saturations once O2 requirements stabilize.  - Acetaminophen 650 mg PO q4h PRN fever. Limit use of NSAIDs.  - HFA albuterol Q6 hour PRN via MDI. Would avoid nebulized preparations to limit risk of aerosol formation.  - Started on IV Dexamethasone and Remdesevir  - Labs Testing: Daily or As Needed  - Start pharmacologic DVT PPx: Lovenox 40 mg SQ daily   - Goals of Care discussion had with patient: Patient is in full code status.    2. Lovenox for DVT ppx    3.  Code status: Patient is in full code status.

## 2021-12-16 NOTE — ED STATDOCS - PROGRESS NOTE DETAILS
Attending Donta, 60 yo presents to ED for SOB.  Pt is not vaccinated for covid19.  pt + covid19 infection for 2 weeks.  Pt states that he is sleeping a lot.  Home sats in the 80's.  Pt sating 88 - 89 % room air.  plan triage to main for further eval and work up.

## 2021-12-16 NOTE — H&P ADULT - NSICDXPASTSURGICALHX_GEN_ALL_CORE_FT
PAST SURGICAL HISTORY:  H/O neck surgery anterior cervical fusion 2010    S/P colonoscopy     S/P foot surgery, right     S/P lumbar fusion 2013

## 2021-12-16 NOTE — H&P ADULT - HISTORY OF PRESENT ILLNESS
60 y/o Male with a PMHx of chronic back pain s/p surgery (on daily Percocet) presents to the ED with complain of shortness of breath, worsening x 1 week. Patient states he tested positive for COVID 2 weeks ago with an at home test. Patient reports developing worsening lethargy, nausea, SOB and confusion x 1 week ago. Patient also experienced fever and cough x2 days. Denies vomiting, diarrhea, Chest pain. Patient is not COVID vaccinated. His Ox sat in RA at home earlier today was 82 %, so he decided to come to the hospital.

## 2021-12-16 NOTE — ED ADULT TRIAGE NOTE - CHIEF COMPLAINT QUOTE
Pt arrives to ED complaining of shortness of breath. Pt tested +COVID two weeks ago. denies medical history.

## 2021-12-16 NOTE — H&P ADULT - NSICDXPASTMEDICALHX_GEN_ALL_CORE_FT
PAST MEDICAL HISTORY:  Back pain     Elevated blood pressure reading     History of colon polyps     Knee pain, right     Meniscus tear right    Obesity

## 2021-12-16 NOTE — H&P ADULT - NSHPPHYSICALEXAM_GEN_ALL_CORE
Vital Signs Last 24 Hrs  T(C): 36.8 (16 Dec 2021 20:18), Max: 36.9 (16 Dec 2021 16:49)  T(F): 98.3 (16 Dec 2021 20:18), Max: 98.4 (16 Dec 2021 16:49)  HR: 71 (16 Dec 2021 20:18) (71 - 92)  BP: 124/85 (16 Dec 2021 20:18) (120/87 - 124/85)  BP(mean): 92 (16 Dec 2021 20:18) (92 - 97)  RR: 18 (16 Dec 2021 20:18) (16 - 18)  SpO2: 96% (16 Dec 2021 20:18) (91% - 96%)

## 2021-12-16 NOTE — ED PROVIDER NOTE - ENMT, MLM
Airway patent, Nasal mucosa clear. Throat has no vesicles, no oropharyngeal exudates and uvula is midline. (+) dry mucous membranes Airway patent, Nasal mucosa clear. (+) dry mucous membranes

## 2021-12-16 NOTE — ED PROVIDER NOTE - OBJECTIVE STATEMENT
62 y/o M with a PMHx of chronic back pain s/p surgery (on daily Percocet) presents to the ED c/o SOB, worsening x 1 week. Pt states he tested positive for COVID 2 weeks ago with an at home test. Pt experienced fever and cough x2 days following test which has improved. Pt then reports developing worsening lethargy, nausea, SOB and confusion x 1 week ago. Denies vomiting, diarrhea, CP. Pt is not COVID vaccinated. PCP: Dr. Sanchez.

## 2021-12-16 NOTE — ED PROVIDER NOTE - NSICDXFAMILYHX_GEN_ALL_CORE_FT
FAMILY HISTORY:  Father  Still living? No  Family history of dementia, Age at diagnosis: Age Unknown    Mother  Still living? Yes, Estimated age: 71-80  Family history of diabetes mellitus, Age at diagnosis: Age Unknown

## 2021-12-16 NOTE — H&P ADULT - NEUROLOGICAL DETAILS
alert and oriented x 3/responds to pain/responds to verbal commands/sensation intact/deep reflexes intact/normal strength

## 2021-12-17 LAB
A1C WITH ESTIMATED AVERAGE GLUCOSE RESULT: 6.3 % — HIGH (ref 4–5.6)
ALBUMIN SERPL ELPH-MCNC: 2.9 G/DL — LOW (ref 3.3–5)
ALP SERPL-CCNC: 50 U/L — SIGNIFICANT CHANGE UP (ref 40–120)
ALT FLD-CCNC: 28 U/L — SIGNIFICANT CHANGE UP (ref 12–78)
ANION GAP SERPL CALC-SCNC: 8 MMOL/L — SIGNIFICANT CHANGE UP (ref 5–17)
AST SERPL-CCNC: 25 U/L — SIGNIFICANT CHANGE UP (ref 15–37)
BILIRUB SERPL-MCNC: 0.6 MG/DL — SIGNIFICANT CHANGE UP (ref 0.2–1.2)
BUN SERPL-MCNC: 23 MG/DL — SIGNIFICANT CHANGE UP (ref 7–23)
CALCIUM SERPL-MCNC: 10.2 MG/DL — HIGH (ref 8.5–10.1)
CHLORIDE SERPL-SCNC: 108 MMOL/L — SIGNIFICANT CHANGE UP (ref 96–108)
CK SERPL-CCNC: 63 U/L — SIGNIFICANT CHANGE UP (ref 26–308)
CO2 SERPL-SCNC: 22 MMOL/L — SIGNIFICANT CHANGE UP (ref 22–31)
CREAT SERPL-MCNC: 0.84 MG/DL — SIGNIFICANT CHANGE UP (ref 0.5–1.3)
CRP SERPL-MCNC: 125 MG/L — HIGH
CRP SERPL-MCNC: 133 MG/L — HIGH
ESTIMATED AVERAGE GLUCOSE: 134 MG/DL — HIGH (ref 68–114)
FERRITIN SERPL-MCNC: 568 NG/ML — HIGH (ref 30–400)
GLUCOSE SERPL-MCNC: 131 MG/DL — HIGH (ref 70–99)
HCT VFR BLD CALC: 40.7 % — SIGNIFICANT CHANGE UP (ref 39–50)
HCV AB S/CO SERPL IA: 0.13 S/CO — SIGNIFICANT CHANGE UP (ref 0–0.99)
HCV AB SERPL-IMP: SIGNIFICANT CHANGE UP
HGB BLD-MCNC: 13.5 G/DL — SIGNIFICANT CHANGE UP (ref 13–17)
LDH SERPL L TO P-CCNC: 326 U/L — HIGH (ref 84–241)
MCHC RBC-ENTMCNC: 28.3 PG — SIGNIFICANT CHANGE UP (ref 27–34)
MCHC RBC-ENTMCNC: 33.2 GM/DL — SIGNIFICANT CHANGE UP (ref 32–36)
MCV RBC AUTO: 85.3 FL — SIGNIFICANT CHANGE UP (ref 80–100)
PLATELET # BLD AUTO: 187 K/UL — SIGNIFICANT CHANGE UP (ref 150–400)
POTASSIUM SERPL-MCNC: 4.4 MMOL/L — SIGNIFICANT CHANGE UP (ref 3.5–5.3)
POTASSIUM SERPL-SCNC: 4.4 MMOL/L — SIGNIFICANT CHANGE UP (ref 3.5–5.3)
PROCALCITONIN SERPL-MCNC: 0.1 NG/ML — SIGNIFICANT CHANGE UP (ref 0.02–0.1)
PROT SERPL-MCNC: 7.5 GM/DL — SIGNIFICANT CHANGE UP (ref 6–8.3)
RBC # BLD: 4.77 M/UL — SIGNIFICANT CHANGE UP (ref 4.2–5.8)
RBC # FLD: 13.1 % — SIGNIFICANT CHANGE UP (ref 10.3–14.5)
SODIUM SERPL-SCNC: 138 MMOL/L — SIGNIFICANT CHANGE UP (ref 135–145)
WBC # BLD: 4.28 K/UL — SIGNIFICANT CHANGE UP (ref 3.8–10.5)
WBC # FLD AUTO: 4.28 K/UL — SIGNIFICANT CHANGE UP (ref 3.8–10.5)

## 2021-12-17 PROCEDURE — 99232 SBSQ HOSP IP/OBS MODERATE 35: CPT

## 2021-12-17 RX ORDER — ENOXAPARIN SODIUM 100 MG/ML
40 INJECTION SUBCUTANEOUS EVERY 12 HOURS
Refills: 0 | Status: DISCONTINUED | OUTPATIENT
Start: 2021-12-17 | End: 2021-12-20

## 2021-12-17 RX ORDER — INFLUENZA VIRUS VACCINE 15; 15; 15; 15 UG/.5ML; UG/.5ML; UG/.5ML; UG/.5ML
0.5 SUSPENSION INTRAMUSCULAR ONCE
Refills: 0 | Status: COMPLETED | OUTPATIENT
Start: 2021-12-17 | End: 2021-12-17

## 2021-12-17 RX ADMIN — Medication 100 MILLIGRAM(S): at 09:44

## 2021-12-17 RX ADMIN — ENOXAPARIN SODIUM 40 MILLIGRAM(S): 100 INJECTION SUBCUTANEOUS at 22:06

## 2021-12-17 RX ADMIN — REMDESIVIR 500 MILLIGRAM(S): 5 INJECTION INTRAVENOUS at 18:43

## 2021-12-17 RX ADMIN — Medication 6 MILLIGRAM(S): at 05:30

## 2021-12-17 RX ADMIN — ENOXAPARIN SODIUM 40 MILLIGRAM(S): 100 INJECTION SUBCUTANEOUS at 09:43

## 2021-12-17 NOTE — PATIENT PROFILE ADULT - FALL HARM RISK - UNIVERSAL INTERVENTIONS
Bed in lowest position, wheels locked, appropriate side rails in place/Call bell, personal items and telephone in reach/Instruct patient to call for assistance before getting out of bed or chair/Non-slip footwear when patient is out of bed/[place] to call system/Physically safe environment - no spills, clutter or unnecessary equipment/Purposeful Proactive Rounding/Room/bathroom lighting operational, light cord in reach

## 2021-12-17 NOTE — PROGRESS NOTE ADULT - ASSESSMENT
A/P:    1.  #Viral Pneumonia secondary to Novel Coronavirus Infection  - Maintain on airborne isolation.  - Continue with O2 as needed via nasal cannula now on 5-6 lpm and up-titrate as needed. If on non-rebreather mask, start continuous oximetry monitoring.  - Acetaminophen 650 mg PO q4h PRN fever.   - HFA albuterol Q6 hour PRN via MDI.   - cont IV Dexamethasone and Remdesevir  - A1C 6.3, boderline, monitor glucose on BMPs, if elevated will add oral hypoglycemic or insulin   - incentive spirometer and supportive care PRN   - Lovenox 40 mg SQ BID     2. Lovenox for DVT ppx    3.  Code status: Patient is in full code status.

## 2021-12-18 DIAGNOSIS — F41.9 ANXIETY DISORDER, UNSPECIFIED: ICD-10-CM

## 2021-12-18 DIAGNOSIS — U07.1 COVID-19: ICD-10-CM

## 2021-12-18 DIAGNOSIS — M54.9 DORSALGIA, UNSPECIFIED: ICD-10-CM

## 2021-12-18 DIAGNOSIS — J96.01 ACUTE RESPIRATORY FAILURE WITH HYPOXIA: ICD-10-CM

## 2021-12-18 LAB
ANION GAP SERPL CALC-SCNC: 4 MMOL/L — LOW (ref 5–17)
BUN SERPL-MCNC: 26 MG/DL — HIGH (ref 7–23)
CALCIUM SERPL-MCNC: 9.8 MG/DL — SIGNIFICANT CHANGE UP (ref 8.5–10.1)
CHLORIDE SERPL-SCNC: 112 MMOL/L — HIGH (ref 96–108)
CO2 SERPL-SCNC: 26 MMOL/L — SIGNIFICANT CHANGE UP (ref 22–31)
CREAT SERPL-MCNC: 0.94 MG/DL — SIGNIFICANT CHANGE UP (ref 0.5–1.3)
FERRITIN SERPL-MCNC: 556 NG/ML — HIGH (ref 30–400)
GLUCOSE SERPL-MCNC: 130 MG/DL — HIGH (ref 70–99)
POTASSIUM SERPL-MCNC: 4.8 MMOL/L — SIGNIFICANT CHANGE UP (ref 3.5–5.3)
POTASSIUM SERPL-SCNC: 4.8 MMOL/L — SIGNIFICANT CHANGE UP (ref 3.5–5.3)
SODIUM SERPL-SCNC: 142 MMOL/L — SIGNIFICANT CHANGE UP (ref 135–145)

## 2021-12-18 PROCEDURE — 99233 SBSQ HOSP IP/OBS HIGH 50: CPT

## 2021-12-18 RX ORDER — LANOLIN ALCOHOL/MO/W.PET/CERES
5 CREAM (GRAM) TOPICAL AT BEDTIME
Refills: 0 | Status: DISCONTINUED | OUTPATIENT
Start: 2021-12-18 | End: 2021-12-20

## 2021-12-18 RX ORDER — ALPRAZOLAM 0.25 MG
0.5 TABLET ORAL
Refills: 0 | Status: DISCONTINUED | OUTPATIENT
Start: 2021-12-18 | End: 2021-12-20

## 2021-12-18 RX ADMIN — ENOXAPARIN SODIUM 40 MILLIGRAM(S): 100 INJECTION SUBCUTANEOUS at 22:26

## 2021-12-18 RX ADMIN — Medication 6 MILLIGRAM(S): at 05:32

## 2021-12-18 RX ADMIN — ENOXAPARIN SODIUM 40 MILLIGRAM(S): 100 INJECTION SUBCUTANEOUS at 10:29

## 2021-12-18 RX ADMIN — REMDESIVIR 500 MILLIGRAM(S): 5 INJECTION INTRAVENOUS at 17:59

## 2021-12-18 RX ADMIN — Medication 5 MILLIGRAM(S): at 22:27

## 2021-12-18 NOTE — PROGRESS NOTE ADULT - ASSESSMENT
ASSESSMENT: 62 yo M/ w/ hx chronic LBP, not vaccinated against COVID-19 w/    >acute hypoxic respiratory failure 2/2 COVID-19 PNA  - Maintain on airborne isolation.  - Continue with O2 as needed via nasal cannula as symptoms are improved titrate to off as tolerated  - Acetaminophen 650 mg PO q4h PRN fever.   - HFA albuterol Q6 hour PRN via MDI.   - cont IV Dexamethasone and Remdesevir  - A1C 6.3, boderline, monitor glucose on BMPs, if elevated will add oral hypoglycemic or insulin   - incentive spirometer and supportive care PRN   - continue tessalon for cough    2. Acute anxiety  - add xanax PRN    3. Insomnia  - melatonin PRN    4 VTE ppx  - continue Lovenox     5. Code status: perform CPR  Patient is in full code status.     6. Dispo  home once completes treatment regimen 5days remdesivir/decadron and off O2

## 2021-12-19 LAB
ALBUMIN SERPL ELPH-MCNC: 2.7 G/DL — LOW (ref 3.3–5)
ALP SERPL-CCNC: 45 U/L — SIGNIFICANT CHANGE UP (ref 40–120)
ALT FLD-CCNC: 31 U/L — SIGNIFICANT CHANGE UP (ref 12–78)
ANION GAP SERPL CALC-SCNC: 5 MMOL/L — SIGNIFICANT CHANGE UP (ref 5–17)
AST SERPL-CCNC: 19 U/L — SIGNIFICANT CHANGE UP (ref 15–37)
BILIRUB SERPL-MCNC: 0.4 MG/DL — SIGNIFICANT CHANGE UP (ref 0.2–1.2)
BUN SERPL-MCNC: 31 MG/DL — HIGH (ref 7–23)
CALCIUM SERPL-MCNC: 9.8 MG/DL — SIGNIFICANT CHANGE UP (ref 8.5–10.1)
CHLORIDE SERPL-SCNC: 112 MMOL/L — HIGH (ref 96–108)
CO2 SERPL-SCNC: 25 MMOL/L — SIGNIFICANT CHANGE UP (ref 22–31)
CREAT SERPL-MCNC: 0.98 MG/DL — SIGNIFICANT CHANGE UP (ref 0.5–1.3)
GLUCOSE SERPL-MCNC: 149 MG/DL — HIGH (ref 70–99)
HCT VFR BLD CALC: 40.3 % — SIGNIFICANT CHANGE UP (ref 39–50)
HGB BLD-MCNC: 13.3 G/DL — SIGNIFICANT CHANGE UP (ref 13–17)
MCHC RBC-ENTMCNC: 28.1 PG — SIGNIFICANT CHANGE UP (ref 27–34)
MCHC RBC-ENTMCNC: 33 GM/DL — SIGNIFICANT CHANGE UP (ref 32–36)
MCV RBC AUTO: 85 FL — SIGNIFICANT CHANGE UP (ref 80–100)
PLATELET # BLD AUTO: 187 K/UL — SIGNIFICANT CHANGE UP (ref 150–400)
POTASSIUM SERPL-MCNC: 4.7 MMOL/L — SIGNIFICANT CHANGE UP (ref 3.5–5.3)
POTASSIUM SERPL-SCNC: 4.7 MMOL/L — SIGNIFICANT CHANGE UP (ref 3.5–5.3)
PROT SERPL-MCNC: 6.7 GM/DL — SIGNIFICANT CHANGE UP (ref 6–8.3)
RBC # BLD: 4.74 M/UL — SIGNIFICANT CHANGE UP (ref 4.2–5.8)
RBC # FLD: 13.2 % — SIGNIFICANT CHANGE UP (ref 10.3–14.5)
SODIUM SERPL-SCNC: 142 MMOL/L — SIGNIFICANT CHANGE UP (ref 135–145)
WBC # BLD: 8.79 K/UL — SIGNIFICANT CHANGE UP (ref 3.8–10.5)
WBC # FLD AUTO: 8.79 K/UL — SIGNIFICANT CHANGE UP (ref 3.8–10.5)

## 2021-12-19 PROCEDURE — 99232 SBSQ HOSP IP/OBS MODERATE 35: CPT

## 2021-12-19 RX ADMIN — Medication 5 MILLIGRAM(S): at 21:51

## 2021-12-19 RX ADMIN — ENOXAPARIN SODIUM 40 MILLIGRAM(S): 100 INJECTION SUBCUTANEOUS at 21:46

## 2021-12-19 RX ADMIN — ENOXAPARIN SODIUM 40 MILLIGRAM(S): 100 INJECTION SUBCUTANEOUS at 10:42

## 2021-12-19 RX ADMIN — Medication 6 MILLIGRAM(S): at 05:23

## 2021-12-19 RX ADMIN — REMDESIVIR 500 MILLIGRAM(S): 5 INJECTION INTRAVENOUS at 17:35

## 2021-12-19 NOTE — PROGRESS NOTE ADULT - NSPROGADDITIONALINFOA_GEN_ALL_CORE
I have personally interviewed and examined this patient, reviewed pertinent clinical information, and performed the evaluation and management services provided at today's visit for inpatient medical follow up    I am available to discuss any issues related to the medical care of this patient on the unit, or by phone at [phone]    Pt provided opportunity for questions and all were answered; declined offer to update his adult children.
I have personally interviewed and examined this patient, reviewed pertinent clinical information, and performed the evaluation and management services provided at today's visit for inpatient medical follow up    I am available to discuss any issues related to the medical care of this patient on the unit, or by phone at [phone]    Pt provided opportunity for questions and all were answered; declined offer to update his adult children.

## 2021-12-19 NOTE — PROGRESS NOTE ADULT - SUBJECTIVE AND OBJECTIVE BOX
Patient is a 61y old  Male who presents with a chief complaint of Shortness of breath and Hypoxia (16 Dec 2021 23:04)      SUBJECTIVE:   HPI:  62 y/o Male with a PMHx of chronic back pain s/p surgery (on daily Percocet) presents to the ED with complain of shortness of breath, worsening x 1 week. Patient states he tested positive for COVID 2 weeks ago with an at home test. Patient reports developing worsening lethargy, nausea, SOB and confusion x 1 week ago. Patient also experienced fever and cough x2 days. Denies vomiting, diarrhea, Chest pain. Patient is not COVID vaccinated. His Ox sat in RA at home earlier today was 82 %, so he decided to come to the hospital.  (16 Dec 2021 23:04)    12/17: seen and examined laying in bed. was hypoxic on 5 lpm, self proned, now on 6 lpm. c/o weakness and dyspnea     REVIEW OF SYSTEMS:    CONSTITUTIONAL: + weakness, no fevers or chills  EYES/ENT: No visual changes;  No vertigo or throat pain   NECK: No pain or stiffness  RESPIRATORY: No cough, wheezing, hemoptysis; +exertional shortness of breath  CARDIOVASCULAR: No chest pain or palpitations  GASTROINTESTINAL: No abdominal or epigastric pain. No nausea, vomiting, or hematemesis; No diarrhea or constipation. No melena or hematochezia.  GENITOURINARY: No dysuria, frequency or hematuria  NEUROLOGICAL: No numbness or weakness  SKIN: No itching, burning, rashes, or lesions   All other review of systems is negative unless indicated above      Vital Signs Last 24 Hrs  T(C): 36.3 (17 Dec 2021 09:13), Max: 37.2 (17 Dec 2021 03:25)  T(F): 97.3 (17 Dec 2021 09:13), Max: 98.9 (17 Dec 2021 03:25)  HR: 59 (17 Dec 2021 09:13) (59 - 92)  BP: 135/88 (17 Dec 2021 09:13) (111/76 - 136/84)  BP(mean): 84 (17 Dec 2021 02:02) (84 - 97)  RR: 18 (17 Dec 2021 09:13) (16 - 18)  SpO2: 90% (17 Dec 2021 09:13) (90% - 96%) --- 6lpm NC       PHYSICAL EXAM:    Constitutional: NAD, awake and alert, well-developed  HEENT: PERR, EOMI, Normal Hearing, MMM  Neck: Soft and supple, No LAD, No JVD  Respiratory: Breath sounds are diminished bilaterally.   Cardiovascular: S1 and S2, regular rate and rhythm, no Murmurs, gallops or rubs  Gastrointestinal: Bowel Sounds present, soft, nontender, nondistended, no guarding, no rebound  Extremities: No peripheral edema  Vascular: 2+ peripheral pulses  Neurological: A/O x 3, no focal deficits  Musculoskeletal: 5/5 strength b/l upper and lower extremities  Skin: No rashes    MEDICATIONS:  MEDICATIONS  (STANDING):  dexAMETHasone  Injectable 6 milliGRAM(s) IV Push daily  enoxaparin Injectable 40 milliGRAM(s) SubCutaneous every 12 hours  influenza   Vaccine 0.5 milliLiter(s) IntraMuscular once  remdesivir  IVPB   IV Intermittent   remdesivir  IVPB 100 milliGRAM(s) IV Intermittent every 24 hours      LABS: All Labs Reviewed:                        13.5   4.28  )-----------( 187      ( 17 Dec 2021 08:39 )             40.7     12-17    138  |  108  |  23  ----------------------------<  131<H>  4.4   |  22  |  0.84    Ca    10.2<H>      17 Dec 2021 08:39    TPro  7.5  /  Alb  2.9<L>  /  TBili  0.6  /  DBili  x   /  AST  25  /  ALT  28  /  AlkPhos  50  12-17    CARDIAC MARKERS ( 17 Dec 2021 08:39 )  x     / x     / 63 U/L / x     / x          Blood Culture:     RADIOLOGY/EKG:  < from: Xray Chest 1 View- PORTABLE-Urgent (Xray Chest 1 View- PORTABLE-Urgent .) (12.16.21 @ 18:31) >    IMPRESSION: Bilateral infiltrates as above.    --- End of Report ---      MADDISON MONSALVE MD; Attending Radiologist  This document has been electronically signed. Dec 17 2021  1:54PM    < end of copied text >          
Patient is a 61y old  Male who presents with a chief complaint of Shortness of breath and Hypoxia (16 Dec 2021 23:04)      SUBJECTIVE:   HPI:  62 y/o Male with a PMHx of chronic back pain s/p surgery (on daily Percocet) presents to the ED with complain of shortness of breath, worsening x 1 week. Patient states he tested positive for COVID 2 weeks ago with an at home test. Patient reports developing worsening lethargy, nausea, SOB and confusion x 1 week ago. Patient also experienced fever and cough x2 days. Denies vomiting, diarrhea, Chest pain. Patient is not COVID vaccinated. His Ox sat in RA at home earlier today was 82 %, so he decided to come to the hospital.  (16 Dec 2021 23:04)    12/17: seen and examined laying in bed. was hypoxic on 5 lpm, self proned, now on 6 lpm. c/o weakness and dyspnea   12/18: c/o insomnia and anxiety being in hospital; had a "bad night" as roommate confused, pulling out iv's; anxious. Breathing and cough are better with current treatments. Wants to go home. +BM's, no other c/o and ROS otherwise negative      REVIEW OF SYSTEMS:    CONSTITUTIONAL: + weakness, no fevers or chills  EYES/ENT: No visual changes;  No vertigo or throat pain   NECK: No pain or stiffness  RESPIRATORY: No cough, wheezing, hemoptysis; +exertional shortness of breath  CARDIOVASCULAR: No chest pain or palpitations  GASTROINTESTINAL: No abdominal or epigastric pain. No nausea, vomiting, or hematemesis; No diarrhea or constipation. No melena or hematochezia.  GENITOURINARY: No dysuria, frequency or hematuria  NEUROLOGICAL: No numbness or weakness  SKIN: No itching, burning, rashes, or lesions   All other review of systems is negative unless indicated above    Vital Signs Last 24 Hrs  T(C): 36.2 (18 Dec 2021 09:28), Max: 36.4 (17 Dec 2021 22:03)  T(F): 97.1 (18 Dec 2021 09:28), Max: 97.6 (17 Dec 2021 22:03)  HR: 61 (18 Dec 2021 09:28) (54 - 61)  BP: 122/90 (18 Dec 2021 09:28) (122/90 - 128/79)  BP(mean): --  RR: 18 (17 Dec 2021 22:03) (18 - 18)  SpO2: 91% (18 Dec 2021 09:28) (91% - 94%)    PHYSICAL EXAM:    Constitutional: NAD, awake and alert, well-developed  HEENT: PERR, EOMI, Normal Hearing, MMM  Neck: Soft and supple, No LAD, No JVD  Respiratory: Breath sounds are diminished bilaterally.   Cardiovascular: S1 and S2, regular rate and rhythm, no Murmurs, gallops or rubs  Gastrointestinal: Bowel Sounds present, soft, nontender, nondistended, no guarding, no rebound  Extremities: No peripheral edema  Vascular: 2+ peripheral pulses  Neurological: A/O x 3, no focal deficits  Musculoskeletal: 5/5 strength b/l upper and lower extremities  Skin: No rashes    MEDICATIONS:  MEDICATIONS  (STANDING):  dexAMETHasone  Injectable 6 milliGRAM(s) IV Push daily  enoxaparin Injectable 40 milliGRAM(s) SubCutaneous every 12 hours  influenza   Vaccine 0.5 milliLiter(s) IntraMuscular once  remdesivir  IVPB   IV Intermittent   remdesivir  IVPB 100 milliGRAM(s) IV Intermittent every 24 hours      LABS: All Labs Reviewed:    (12-17 @ 08:39)                      13.5  4.28 )-----------( 187                 40.7    Neutrophils = -- (--%)  Lymphocytes = -- (--%)  Eosinophils = -- (--%)  Basophils = -- (--%)  Monocytes = -- (--%)  Bands = --%    12-18    142  |  112<H>  |  26<H>  ----------------------------<  130<H>  4.8   |  26  |  0.94    Ca    9.8      18 Dec 2021 07:29    TPro  7.5  /  Alb  2.9<L>  /  TBili  0.6  /  DBili  x   /  AST  25  /  ALT  28  /  AlkPhos  50  12-17      CARDIAC MARKERS ( 17 Dec 2021 08:39 )  Trop x     / CK 63 U/L / CKMB x               Blood Culture:     RADIOLOGY/EKG:  < from: Xray Chest 1 View- PORTABLE-Urgent (Xray Chest 1 View- PORTABLE-Urgent .) (12.16.21 @ 18:31) >    IMPRESSION: Bilateral infiltrates as above.    --- End of Report ---      MADDISON MONSALVE MD; Attending Radiologist  This document has been electronically signed. Dec 17 2021  1:54PM    < end of copied text >          
Patient is a 61y old  Male who presents with a chief complaint of Shortness of breath and Hypoxia (16 Dec 2021 23:04)      SUBJECTIVE:   HPI:  60 y/o Male with a PMHx of chronic back pain s/p surgery (on daily Percocet) presents to the ED with complain of shortness of breath, worsening x 1 week. Patient states he tested positive for COVID 2 weeks ago with an at home test. Patient reports developing worsening lethargy, nausea, SOB and confusion x 1 week ago. Patient also experienced fever and cough x2 days. Denies vomiting, diarrhea, Chest pain. Patient is not COVID vaccinated. His Ox sat in RA at home earlier today was 82 %, so he decided to come to the hospital.  (16 Dec 2021 23:04)    12/17: seen and examined laying in bed. was hypoxic on 5 lpm, self proned, now on 6 lpm. c/o weakness and dyspnea   12/18: c/o insomnia and anxiety being in hospital; had a "bad night" as roommate confused, pulling out iv's; anxious. Breathing and cough are better with current treatments. Wants to go home. +BM's, no other c/o and ROS otherwise negative  12/19: no overnight issues slept better, denies SOB but is mildly dyspneic      REVIEW OF SYSTEMS:    CONSTITUTIONAL: + weakness, no fevers or chills  EYES/ENT: No visual changes;  No vertigo or throat pain   NECK: No pain or stiffness  RESPIRATORY: No cough, wheezing, hemoptysis; +exertional shortness of breath  CARDIOVASCULAR: No chest pain or palpitations  GASTROINTESTINAL: No abdominal or epigastric pain. No nausea, vomiting, or hematemesis; No diarrhea or constipation. No melena or hematochezia.  GENITOURINARY: No dysuria, frequency or hematuria  NEUROLOGICAL: No numbness or weakness  SKIN: No itching, burning, rashes, or lesions   All other review of systems is negative unless indicated above    Vital Signs Last 24 Hrs  T(C): 36.4 (19 Dec 2021 10:24), Max: 36.4 (18 Dec 2021 22:50)  T(F): 97.5 (19 Dec 2021 10:24), Max: 97.5 (18 Dec 2021 22:50)  HR: 65 (19 Dec 2021 10:24) (61 - 66)  BP: 117/80 (19 Dec 2021 10:24) (116/77 - 125/86)  BP(mean): --  RR: 18 (18 Dec 2021 22:50) (18 - 18)  SpO2: 91% (19 Dec 2021 10:24) (91% - 92%)    PHYSICAL EXAM:    Constitutional: NAD, awake and alert, well-developed  HEENT: PERR, EOMI, Normal Hearing, MMM  Neck: Soft and supple, No LAD, No JVD  Respiratory: Breath sounds are diminished bilaterally.   Cardiovascular: S1 and S2, regular rate and rhythm, no Murmurs, gallops or rubs  Gastrointestinal: Bowel Sounds present, soft, nontender, nondistended, no guarding, no rebound  Extremities: No peripheral edema  Vascular: 2+ peripheral pulses  Neurological: A/O x 3, no focal deficits  Musculoskeletal: 5/5 strength b/l upper and lower extremities  Skin: No rashes    MEDICATIONS:  MEDICATIONS  (STANDING):  dexAMETHasone  Injectable 6 milliGRAM(s) IV Push daily  enoxaparin Injectable 40 milliGRAM(s) SubCutaneous every 12 hours  influenza   Vaccine 0.5 milliLiter(s) IntraMuscular once  remdesivir  IVPB   IV Intermittent   remdesivir  IVPB 100 milliGRAM(s) IV Intermittent every 24 hours      LABS: All Labs Reviewed:    (12-17 @ 08:39)                      13.5  4.28 )-----------( 187                 40.7    Neutrophils = -- (--%)  Lymphocytes = -- (--%)  Eosinophils = -- (--%)  Basophils = -- (--%)  Monocytes = -- (--%)  Bands = --%    12-18    142  |  112<H>  |  26<H>  ----------------------------<  130<H>  4.8   |  26  |  0.94    Ca    9.8      18 Dec 2021 07:29    TPro  7.5  /  Alb  2.9<L>  /  TBili  0.6  /  DBili  x   /  AST  25  /  ALT  28  /  AlkPhos  50  12-17      CARDIAC MARKERS ( 17 Dec 2021 08:39 )  Trop x     / CK 63 U/L / CKMB x               Blood Culture:     RADIOLOGY/EKG:  < from: Xray Chest 1 View- PORTABLE-Urgent (Xray Chest 1 View- PORTABLE-Urgent .) (12.16.21 @ 18:31) >    IMPRESSION: Bilateral infiltrates as above.    --- End of Report ---      MADDISON OMNSALVE MD; Attending Radiologist  This document has been electronically signed. Dec 17 2021  1:54PM    < end of copied text >

## 2021-12-19 NOTE — PROGRESS NOTE ADULT - ASSESSMENT
ASSESSMENT: 60 yo M/ w/ hx chronic LBP, not vaccinated against COVID-19 w/    >acute hypoxic respiratory failure 2/2 COVID-19 PNA  - Maintain on airborne isolation.  - Continue with O2 as needed via nasal cannula as symptoms are improved titrate to off as tolerated  - Acetaminophen 650 mg PO q4h PRN fever.   - HFA albuterol Q6 hour PRN via MDI.   - cont IV Dexamethasone and Remdesevir  - A1C 6.3, boderline, monitor glucose on BMPs, if elevated will add oral hypoglycemic or insulin   - incentive spirometer and supportive care PRN   - continue tessalon for cough    2. Acute anxiety  - xanax    3. Insomnia  - melatonin PRN    4 VTE ppx  - continue Lovenox     5. Code status: perform CPR  Patient is in full code status.     6. Dispo  probable home once completes treatment regimen 5days remdesivir/decadron (12/20) and off O2     ASSESSMENT: 60 yo M/ w/ hx chronic LBP, not vaccinated against COVID-19 w/    >acute hypoxic respiratory failure 2/2 COVID-19 PNA  - Maintain on airborne isolation.  - Continue with O2 as needed via nasal cannula as symptoms are improved titrate to off as tolerated  - Acetaminophen 650 mg PO q4h PRN fever.   - HFA albuterol Q6 hour PRN via MDI.   - cont IV Dexamethasone and Remdesevir  - A1C 6.3, boderline, monitor glucose on BMPs, if elevated will add oral hypoglycemic or insulin   - incentive spirometer and supportive care PRN   - continue tessalon for cough    2. Acute anxiety  - xanax    3. Insomnia  - melatonin PRN    4 VTE ppx  - continue Lovenox     5. Code status: perform CPR  Patient is in full code status.     6. Dispo  probable home once completes treatment regimen 5days remdesivir/decadron (12/20) and off O2

## 2021-12-20 ENCOUNTER — TRANSCRIPTION ENCOUNTER (OUTPATIENT)
Age: 61
End: 2021-12-20

## 2021-12-20 VITALS
TEMPERATURE: 98 F | HEART RATE: 67 BPM | SYSTOLIC BLOOD PRESSURE: 112 MMHG | OXYGEN SATURATION: 91 % | DIASTOLIC BLOOD PRESSURE: 84 MMHG

## 2021-12-20 PROCEDURE — 99239 HOSP IP/OBS DSCHRG MGMT >30: CPT

## 2021-12-20 RX ORDER — GUAIFENESIN/DEXTROMETHORPHAN 600MG-30MG
10 TABLET, EXTENDED RELEASE 12 HR ORAL
Qty: 600 | Refills: 0
Start: 2021-12-20 | End: 2021-12-29

## 2021-12-20 RX ADMIN — Medication 6 MILLIGRAM(S): at 05:16

## 2021-12-20 RX ADMIN — REMDESIVIR 500 MILLIGRAM(S): 5 INJECTION INTRAVENOUS at 12:42

## 2021-12-20 RX ADMIN — ENOXAPARIN SODIUM 40 MILLIGRAM(S): 100 INJECTION SUBCUTANEOUS at 09:20

## 2021-12-20 NOTE — PROVIDER CONTACT NOTE (OTHER) - SITUATION
notified Dr Sanchez's office of admission please fax over discharged paperwork once patient is discharged to [phone]

## 2021-12-20 NOTE — DISCHARGE NOTE PROVIDER - HOSPITAL COURSE
HPI:  60 y/o Male with a PMHx of chronic back pain s/p surgery (on daily Percocet) presents to the ED with complain of shortness of breath, worsening x 1 week. Patient states he tested positive for COVID 2 weeks ago with an at home test. Patient reports developing worsening lethargy, nausea, SOB and confusion x 1 week ago. Patient also experienced fever and cough x2 days. Denies vomiting, diarrhea, Chest pain. Patient is not COVID vaccinated. His Ox sat in RA at home earlier today was 82 %, so he decided to come to the hospital.  (16 Dec 2021 23:04)    hospital course: pt admitted for acute hypoxia due to COVID infection. pt given Rem/dex for 5 days. PE study negative - D-dimer 160s. hypoxia resolved now on room air. no longer febrile or dyspenic. pt will be dc on antitussives and mucolytics PRN upon DC.     All 10 systems reviewed and found to be negative with the exception of what has been described above.    Vital Signs Last 24 Hrs  T(C): 36.6 (20 Dec 2021 09:34), Max: 36.6 (20 Dec 2021 00:34)  T(F): 97.8 (20 Dec 2021 09:34), Max: 97.9 (20 Dec 2021 00:34)  HR: 67 (20 Dec 2021 09:34) (57 - 74)  BP: 112/84 (20 Dec 2021 09:34) (108/77 - 125/90)  BP(mean): --  RR: 17 (20 Dec 2021 00:34) (17 - 18)  SpO2: 91% (20 Dec 2021 09:34) (90% - 92%) --- room air     LABS                         13.3   8.79  )-----------( 187      ( 19 Dec 2021 07:13 )             40.3     12-19    142  |  112<H>  |  31<H>  ----------------------------<  149<H>  4.7   |  25  |  0.98    Ca    9.8      19 Dec 2021 07:13    TPro  6.7  /  Alb  2.7<L>  /  TBili  0.4  /  DBili  x   /  AST  19  /  ALT  31  /  AlkPhos  45  12-19    LIVER FUNCTIONS - ( 19 Dec 2021 07:13 )  Alb: 2.7 g/dL / Pro: 6.7 gm/dL / ALK PHOS: 45 U/L / ALT: 31 U/L / AST: 19 U/L / GGT: x           PHYSICAL EXAM:    Constitutional: NAD, awake and alert, well-developed  HEENT: PERR, EOMI, Normal Hearing, MMM  Neck: Soft and supple, No LAD, No JVD  Respiratory: Breath sounds clear equal bilat. no rales rhonchi or wheeze   Cardiovascular: S1 and S2, regular rate and rhythm, no Murmurs, gallops or rubs  Gastrointestinal: Bowel Sounds present, soft, nontender, nondistended, no guarding, no rebound  Extremities: No peripheral edema  Vascular: 2+ peripheral pulses  Neurological: A/O x 3, no focal deficits  Musculoskeletal: 5/5 strength b/l upper and lower extremities  Skin: No rashes      A&P   >acute hypoxic respiratory failure 2/2 COVID-19 PNA  - s/p airborne isolation.   -s/p supplemental O2 as needed via nasal cannula as symptoms are improved titrate to off as tolerated --- now on room air   - Acetaminophen 650 mg PO q4h PRN fever. upon DC   - s/p HFA albuterol Q6 hour PRN via MDI.   - s/p 5 days of IV Dexamethasone and Remdesevir  - A1C 6.3, borderline diet and lifestyle modification enforced  - incentive spirometer and supportive care PRN upon DC   - continue tessalon for cough prn     2. Acute anxiety  - xanax resolved     3. Insomnia  - melatonin PRN upon DC     4 VTE ppx  - s/p Lovenox does not meet criteria for extended vte therapy     5. Code status: perform CPR  Patient is in full code status.     6. Dispo : dc home today     above plan discussed with pt, bedside RN and MD Nelson   time spent preparing dc 40 mins

## 2021-12-20 NOTE — DISCHARGE NOTE PROVIDER - NSDCMRMEDTOKEN_GEN_ALL_CORE_FT
benzonatate 100 mg oral capsule: 1 cap(s) orally 3 times a day, As Needed -Cough - for cough   guaifenesin-dextromethorphan 100 mg-10 mg/5 mL oral liquid: 10 milliliter(s) orally every 4 hours, As Needed -Cough - for bronchospasm - for cough   Hycodan 5 mg-1.5 mg/5 mL oral syrup: 5 milliliter(s) orally once a day (at bedtime), As Needed -for cough MDD:5   methylPREDNISolone 4 mg oral tablet: 4 milligram(s) orally , As Needed - for arthritis flare  oxycodone-acetaminophen 10 mg-325 mg oral tablet: 1 tab(s) orally 3 times a day

## 2021-12-20 NOTE — DISCHARGE NOTE PROVIDER - NSDCCPCAREPLAN_GEN_ALL_CORE_FT
PRINCIPAL DISCHARGE DIAGNOSIS  Diagnosis: Pneumonia due to COVID-19 virus  Assessment and Plan of Treatment: COVID-19 (coronavirus disease) is an infection that is caused by a large family of viruses. Some viruses cause illness in people and others cause illness in animals like camels, cats, and bats. In some cases, the viruses that cause illness in animals can spread to humans  Early on, this disease was called novel coronavirus. This is because scientists determined that the disease was caused by a new (novel) respiratory virus. The World Health Organization (WHO) has now named the disease COVID-19, or coronavirus disease.Some people may be at higher risk for complications from coronavirus disease. This includes older adults and people who have chronic diseases, such as heart disease, diabetes, and lung disease.  If you are at higher risk for complications, take these extra precautions:  •Stay home as much as possible.  •Avoid social gatherings and travel.  •Avoid close contact with others. Stay at least 6 ft (2 m) away from others, if possible.  •Wash your hands often with soap and water for at least 20 seconds.  •Avoid touching your face, mouth, nose, or eyes.  •Keep supplies on hand at home, such as food, medicine, and cleaning supplies.  •If you must go out in public, wear a cloth face covering or face mask. Make sure your mask covers your nose and mouth.  *** Monitor for the following signs/symptoms and contact your care provider if they occur or go to the ER if your symptoms are severe.   •Trouble breathing.  •Pain or pressure in your chest.  •Confusion.  •Blue-tinged lips and fingernails.  •Difficulty waking from sleep.  •Symptoms that get worse.

## 2021-12-20 NOTE — DISCHARGE NOTE NURSING/CASE MANAGEMENT/SOCIAL WORK - PATIENT PORTAL LINK FT
You can access the FollowMyHealth Patient Portal offered by [facility] by registering at the following website: [email or web address]. By joining [employer]’s FollowMyHealth portal, you will also be able to view your health information using other applications (apps) compatible with our system.

## 2021-12-20 NOTE — DISCHARGE NOTE NURSING/CASE MANAGEMENT/SOCIAL WORK - NSDCPEFALRISK_GEN_ALL_CORE
For information on Fall & Injury Prevention, visit: [email or web address] OR  [email or web address] OR  https://www.cdc.gov/steadi/patient.html

## 2021-12-22 LAB
CULTURE RESULTS: SIGNIFICANT CHANGE UP
SPECIMEN SOURCE: SIGNIFICANT CHANGE UP

## 2022-01-03 DIAGNOSIS — M54.9 DORSALGIA, UNSPECIFIED: ICD-10-CM

## 2022-01-03 DIAGNOSIS — U07.1 COVID-19: ICD-10-CM

## 2022-01-03 DIAGNOSIS — G89.29 OTHER CHRONIC PAIN: ICD-10-CM

## 2022-01-03 DIAGNOSIS — E66.9 OBESITY, UNSPECIFIED: ICD-10-CM

## 2022-01-03 DIAGNOSIS — Z79.891 LONG TERM (CURRENT) USE OF OPIATE ANALGESIC: ICD-10-CM

## 2022-01-03 DIAGNOSIS — J12.82 PNEUMONIA DUE TO CORONAVIRUS DISEASE 2019: ICD-10-CM

## 2022-01-03 DIAGNOSIS — F41.9 ANXIETY DISORDER, UNSPECIFIED: ICD-10-CM

## 2022-01-03 DIAGNOSIS — G47.00 INSOMNIA, UNSPECIFIED: ICD-10-CM

## 2022-01-03 DIAGNOSIS — J96.01 ACUTE RESPIRATORY FAILURE WITH HYPOXIA: ICD-10-CM

## 2022-01-03 DIAGNOSIS — J12.9 VIRAL PNEUMONIA, UNSPECIFIED: ICD-10-CM

## 2022-01-13 ENCOUNTER — APPOINTMENT (OUTPATIENT)
Dept: INTERNAL MEDICINE | Facility: CLINIC | Age: 62
End: 2022-01-13
Payer: COMMERCIAL

## 2022-01-13 ENCOUNTER — NON-APPOINTMENT (OUTPATIENT)
Age: 62
End: 2022-01-13

## 2022-01-13 VITALS — DIASTOLIC BLOOD PRESSURE: 80 MMHG | HEIGHT: 74 IN | SYSTOLIC BLOOD PRESSURE: 130 MMHG

## 2022-01-13 DIAGNOSIS — R05.9 COUGH, UNSPECIFIED: ICD-10-CM

## 2022-01-13 DIAGNOSIS — Z86.16 PERSONAL HISTORY OF COVID-19: ICD-10-CM

## 2022-01-13 DIAGNOSIS — U07.1 COVID-19: ICD-10-CM

## 2022-01-13 DIAGNOSIS — J12.82 COVID-19: ICD-10-CM

## 2022-01-13 PROCEDURE — 99214 OFFICE O/P EST MOD 30 MIN: CPT

## 2022-01-13 NOTE — PHYSICAL EXAM
[No Acute Distress] : no acute distress [No JVD] : no jugular venous distention [No Respiratory Distress] : no respiratory distress  [Normal] : normal rate, regular rhythm, normal S1 and S2 and no murmur heard [No Edema] : there was no peripheral edema

## 2022-01-13 NOTE — REVIEW OF SYSTEMS
[Dyspnea on Exertion] : dyspnea on exertion [Headache] : headache [Fever] : no fever [Chills] : no chills [Chest Pain] : no chest pain [Palpitations] : no palpitations [Shortness Of Breath] : no shortness of breath [Abdominal Pain] : no abdominal pain [Joint Pain] : no joint pain [Muscle Weakness] : no muscle weakness [Joint Swelling] : no joint swelling [Dizziness] : no dizziness [Fainting] : no fainting

## 2022-01-13 NOTE — HISTORY OF PRESENT ILLNESS
[de-identified] : 60 y/o presents for follow up after recent hospitalization at [facility] for COVID-pneumonia and respiratory failure.  He initially tested positive on 12/5 and was seen here on 12/14 with some residual symptoms but is feeling generally well.  The next day he had severe worsening of his symptoms and went to the emergency room.  He was treated with remdesivir and Decadron and slowly improved.  Since discharge he has been feeling better although he still has some shortness of breath with exertion.  He also has a persistent dry cough without any sputum production.

## 2022-01-13 NOTE — ASSESSMENT
[FreeTextEntry1] : Status post COVID-pneumonia respiratory failure–symptomatically he is much improved at present.  He still has some residual dry cough as well as dyspnea with exertion.  He has no chest pain.  There has been no further fever or chills or sputum production.  He was told we will repeat chest x-ray in about a month.  He is also going to wait until approxi-3 months from when he was diagnosed to receive the COVID vaccination.\par He is given a refill for Hycodan syrup which was given to him upon discharge from the hospital and he found very effective.

## 2022-01-21 NOTE — DISCHARGE NOTE PROVIDER - CARE PROVIDER_API CALL
Chadwick Sanchez)  [facility]  [address], [address]  [place] [address]  Phone: [phone]  Fax: [phone]  Follow Up Time:    with patient

## 2022-06-17 ENCOUNTER — APPOINTMENT (OUTPATIENT)
Dept: INTERNAL MEDICINE | Facility: CLINIC | Age: 62
End: 2022-06-17
Payer: COMMERCIAL

## 2022-06-17 VITALS — DIASTOLIC BLOOD PRESSURE: 82 MMHG | SYSTOLIC BLOOD PRESSURE: 122 MMHG

## 2022-06-17 DIAGNOSIS — R31.9 HEMATURIA, UNSPECIFIED: ICD-10-CM

## 2022-06-17 PROCEDURE — 99213 OFFICE O/P EST LOW 20 MIN: CPT | Mod: 25

## 2022-06-17 PROCEDURE — 81002 URINALYSIS NONAUTO W/O SCOPE: CPT

## 2022-06-17 RX ORDER — TADALAFIL 5 MG/1
5 TABLET, FILM COATED ORAL DAILY
Refills: 0 | Status: DISCONTINUED | COMMUNITY
End: 2022-06-17

## 2022-06-17 RX ORDER — HYDROCODONE BITARTRATE AND HOMATROPINE METHYLBROMIDE 1.5; 5 MG/5ML; MG/5ML
5-1.5 SOLUTION ORAL
Refills: 0 | Status: DISCONTINUED | COMMUNITY
Start: 2022-01-13 | End: 2022-06-17

## 2022-06-17 NOTE — HISTORY OF PRESENT ILLNESS
[FreeTextEntry8] : Presents complaining of left lower flank/abdominal pain over the past day.  He had episodes of severe pain associated with nausea and vomiting at times.  He felt the pain in the pubic area at the end and then stopped spontaneously.  It does not recall having passed a stone.

## 2022-06-17 NOTE — PHYSICAL EXAM
[No Acute Distress] : no acute distress [No Respiratory Distress] : no respiratory distress  [Normal Rate] : normal rate  [Regular Rhythm] : with a regular rhythm [Soft] : abdomen soft [Non Tender] : non-tender [No CVA Tenderness] : no CVA  tenderness

## 2022-06-17 NOTE — REVIEW OF SYSTEMS
[Abdominal Pain] : abdominal pain [Fever] : no fever [Chills] : no chills [Chest Pain] : no chest pain [Shortness Of Breath] : no shortness of breath [Dysuria] : no dysuria

## 2022-06-17 NOTE — ASSESSMENT
[FreeTextEntry1] : Probable kidney stone–his symptoms and urinalysis are consistent with kidney stone.  He is asymptomatic at present.  He is not sure whether he passed the stone or not.  He has Flomax at home and is going to restart that daily for now and was encouraged to increase his fluid intake.  He already has pain medication at home to use as needed.  Given a prescription for a CT urogram to assess for the presence of other stones.  If he has any significant increase of his symptoms or develops fever or chills he knows to go to the emergency room for further evaluation.

## 2022-06-27 ENCOUNTER — OUTPATIENT (OUTPATIENT)
Dept: OUTPATIENT SERVICES | Facility: HOSPITAL | Age: 62
LOS: 1 days | End: 2022-06-27
Payer: COMMERCIAL

## 2022-06-27 ENCOUNTER — APPOINTMENT (OUTPATIENT)
Dept: CT IMAGING | Facility: CLINIC | Age: 62
End: 2022-06-27
Payer: COMMERCIAL

## 2022-06-27 DIAGNOSIS — Z98.1 ARTHRODESIS STATUS: Chronic | ICD-10-CM

## 2022-06-27 DIAGNOSIS — N20.0 CALCULUS OF KIDNEY: ICD-10-CM

## 2022-06-27 DIAGNOSIS — Z98.890 OTHER SPECIFIED POSTPROCEDURAL STATES: Chronic | ICD-10-CM

## 2022-06-27 PROCEDURE — 74176 CT ABD & PELVIS W/O CONTRAST: CPT | Mod: 26

## 2022-06-27 PROCEDURE — 74176 CT ABD & PELVIS W/O CONTRAST: CPT

## 2022-07-18 NOTE — ED ADULT TRIAGE NOTE - MODE OF ARRIVAL
TERRY IS ASKING HOW LONG SHE HAS TO QUARANTINE?   HER SYPMTOMS START D [id number] POS TEST 07/14/22.  WHAT ABOUT BEING AROUND [place]?  WHEN CAN SHE GET VACCINATED AGAINST WHOOPING COUGH?   Walk in [employer]

## 2022-07-21 ENCOUNTER — LABORATORY RESULT (OUTPATIENT)
Age: 62
End: 2022-07-21

## 2022-07-21 ENCOUNTER — APPOINTMENT (OUTPATIENT)
Dept: INTERNAL MEDICINE | Facility: CLINIC | Age: 62
End: 2022-07-21

## 2022-07-21 VITALS
WEIGHT: 276 LBS | HEIGHT: 74 IN | BODY MASS INDEX: 35.42 KG/M2 | SYSTOLIC BLOOD PRESSURE: 138 MMHG | HEART RATE: 70 BPM | DIASTOLIC BLOOD PRESSURE: 88 MMHG

## 2022-07-21 DIAGNOSIS — R53.83 OTHER FATIGUE: ICD-10-CM

## 2022-07-21 DIAGNOSIS — M19.90 UNSPECIFIED OSTEOARTHRITIS, UNSPECIFIED SITE: ICD-10-CM

## 2022-07-21 DIAGNOSIS — Z00.00 ENCOUNTER FOR GENERAL ADULT MEDICAL EXAMINATION W/OUT ABNORMAL FINDINGS: ICD-10-CM

## 2022-07-21 DIAGNOSIS — N52.9 MALE ERECTILE DYSFUNCTION, UNSPECIFIED: ICD-10-CM

## 2022-07-21 PROCEDURE — 99396 PREV VISIT EST AGE 40-64: CPT | Mod: 25

## 2022-07-21 PROCEDURE — 36415 COLL VENOUS BLD VENIPUNCTURE: CPT | Mod: 59

## 2022-07-21 RX ORDER — METHYLPREDNISOLONE 4 MG/1
4 TABLET ORAL
Qty: 21 | Refills: 0 | Status: DISCONTINUED | COMMUNITY
Start: 2022-05-05 | End: 2022-07-21

## 2022-07-21 NOTE — PHYSICAL EXAM
[No Acute Distress] : no acute distress [No JVD] : no jugular venous distention [No Lymphadenopathy] : no lymphadenopathy [No Respiratory Distress] : no respiratory distress  [Clear to Auscultation] : lungs were clear to auscultation bilaterally [Normal] : normal rate, regular rhythm, normal S1 and S2 and no murmur heard [No Carotid Bruits] : no carotid bruits [No Edema] : there was no peripheral edema [Non Tender] : non-tender [Grossly Normal Strength/Tone] : grossly normal strength/tone [No Focal Deficits] : no focal deficits [Normal Affect] : the affect was normal [Alert and Oriented x3] : oriented to person, place, and time

## 2022-07-21 NOTE — REVIEW OF SYSTEMS
[Fatigue] : fatigue [Dyspnea on Exertion] : dyspnea on exertion [Joint Pain] : joint pain [Joint Stiffness] : joint stiffness [Back Pain] : back pain [Fever] : no fever [Chills] : no chills [Recent Change In Weight] : ~T no recent weight change [Chest Pain] : no chest pain [Palpitations] : no palpitations [Lower Ext Edema] : no lower extremity edema [Shortness Of Breath] : no shortness of breath [Abdominal Pain] : no abdominal pain [Diarrhea] : no diarrhea [Dysuria] : no dysuria [Muscle Weakness] : no muscle weakness [Joint Swelling] : no joint swelling [Headache] : no headache [Dizziness] : no dizziness

## 2022-07-21 NOTE — HEALTH RISK ASSESSMENT
[Former] : Former [10-14] : 10-14 [Yes] : Yes [2 - 4 times a month (2 pts)] : 2-4 times a month (2 points) [1 or 2 (0 pts)] : 1 or 2 (0 points) [Never (0 pts)] : Never (0 points) [No] : In the past 12 months have you used drugs other than those required for medical reasons? No [0] : 2) Feeling down, depressed, or hopeless: Not at all (0) [PHQ-2 Negative - No further assessment needed] : PHQ-2 Negative - No further assessment needed [Patient reported colonoscopy was abnormal] : Patient reported colonoscopy was abnormal [Audit-CScore] : 2 [[id number]] : 0 [ColonoscopyDate] : 0010 [ColonoscopyComments] : Benign polyps

## 2022-07-21 NOTE — ASSESSMENT
[FreeTextEntry1] : Exam is remarkable for his weight of 276 pounds.\par Fasting labs were sent.\par \par Obesity–weight loss options were discussed.  Thyroid function was also sent.\par \par Arthritis/back pain–she follows regularly with pain specialist.\par \par Recent kidney stone–his stone will be sent for analysis.  Dietary options and hydration were discussed.\par \par Fatigue/malaise–additional lab work including testosterone level was sent.  Also the idea of possible sleep apnea was also discussed.\par \par Last colonoscopy was within 10 years but did reveal benign polyps.  He is going to follow-up with a gastroenterologist for a new study.\par \par PSA was sent for prostate cancer screening.\par \par He is asking about seeing a cardiologist for general evaluation/checkup.  He has no specific symptoms but is concerned because of family history.

## 2022-07-21 NOTE — HISTORY OF PRESENT ILLNESS
[de-identified] : 61-year-old male presents for annual wellness visit as well as fasting labs.\par He has no significant past medical history other than ongoing lumbar disc and arthritis pain issues.  He follows with a pain specialist.  Does have a history of recent kidney stone that recently passed spontaneously.  He has been generally well otherwise without any recent illness.  He did have a colonoscopy within the past 10 years that did reveal benign polyps.  He does complain of generalized malaise and fatigue as well as difficulty losing weight.  He has been on testosterone replacement many years ago but nothing recently.\par Complains of some nonspecific headaches recently.  Does not feel he sleeps well mostly due to discomfort.

## 2022-07-22 LAB
ALBUMIN SERPL ELPH-MCNC: 4.5 G/DL
ALP BLD-CCNC: 56 U/L
ALT SERPL-CCNC: 26 U/L
ANION GAP SERPL CALC-SCNC: 13 MMOL/L
AST SERPL-CCNC: 17 U/L
BASOPHILS # BLD AUTO: 0.02 K/UL
BASOPHILS NFR BLD AUTO: 0.4 %
BILIRUB SERPL-MCNC: 0.7 MG/DL
BUN SERPL-MCNC: 15 MG/DL
CALCIUM SERPL-MCNC: 9.8 MG/DL
CHLORIDE SERPL-SCNC: 109 MMOL/L
CHOLEST SERPL-MCNC: 165 MG/DL
CO2 SERPL-SCNC: 23 MMOL/L
CREAT SERPL-MCNC: 1 MG/DL
EGFR: 86 ML/MIN/1.73M2
EOSINOPHIL # BLD AUTO: 0.1 K/UL
EOSINOPHIL NFR BLD AUTO: 1.9 %
ESTIMATED AVERAGE GLUCOSE: 128 MG/DL
GLUCOSE SERPL-MCNC: 111 MG/DL
HBA1C MFR BLD HPLC: 6.1 %
HCT VFR BLD CALC: 40.8 %
HDLC SERPL-MCNC: 40 MG/DL
HGB BLD-MCNC: 13.3 G/DL
IMM GRANULOCYTES NFR BLD AUTO: 0.4 %
LDLC SERPL CALC-MCNC: 97 MG/DL
LYMPHOCYTES # BLD AUTO: 1.53 K/UL
LYMPHOCYTES NFR BLD AUTO: 28.8 %
MAN DIFF?: NORMAL
MCHC RBC-ENTMCNC: 29 PG
MCHC RBC-ENTMCNC: 32.6 GM/DL
MCV RBC AUTO: 89.1 FL
MONOCYTES # BLD AUTO: 0.42 K/UL
MONOCYTES NFR BLD AUTO: 7.9 %
NEUTROPHILS # BLD AUTO: 3.23 K/UL
NEUTROPHILS NFR BLD AUTO: 60.6 %
NONHDLC SERPL-MCNC: 125 MG/DL
PLATELET # BLD AUTO: 127 K/UL
POTASSIUM SERPL-SCNC: 4.6 MMOL/L
PROT SERPL-MCNC: 6.6 G/DL
PSA SERPL-MCNC: 1.01 NG/ML
RBC # BLD: 4.58 M/UL
RBC # FLD: 14.4 %
SODIUM SERPL-SCNC: 145 MMOL/L
T4 SERPL-MCNC: 5.3 UG/DL
TRIGL SERPL-MCNC: 140 MG/DL
TSH SERPL-ACNC: 1.8 UIU/ML
URATE SERPL-MCNC: 8.2 MG/DL
WBC # FLD AUTO: 5.32 K/UL

## 2022-08-02 LAB
TESTOST FREE SERPL-MCNC: NORMAL
TESTOST SERPL-MCNC: 282 NG/DL

## 2022-10-20 NOTE — H&P PST ADULT - SKIN
Patient ID: Humble Mcdaniels is a 67 year old male.    Chief Complaint   Patient presents with   • Cough     Cough x 3 days getting worse. Possible fever, no recent covid test. No sore throat.    • Injury     Right sided rib pain worse with cough, recent rib fracture.        66 yo male with recent hx of  comminuted, mildly displaced fractures of the posterior right 8th through 12th ribs and lateral right 7th rib fracture after fall presents with worsening shortness of breath and cough over the last 2 to 3 days.  Patient states he has been sitting up in a recliner trying to sleep.  He has been very uncomfortable and having difficulty breathing.  He has been using his accessory muscles more than usual.  He states the cough is deep but dry.  Denies any fevers or chills, however \"did feel slightly warm.\"  He has not had any posttussive emesis or hemoptysis.  He does feel some palpitations at times.  He states he is under surveillance of his rib fractures with his primary care doctor and has been doing his spirometer.  He has been noticing that the spirometer has been difficult to do the last few days.  He also notes he has 2 small nodules that \"he's always had,\" which were noted \"to have changed last CT\"- he was referred to a pulmonologist which he has an appt with in December.  He has been more fatigued the last few days.  Denies any leg swelling or calf pain.  Denies any chest pain.  Does feel like he is having some wheezing intermittently.  He has been trying some over-the-counter Robitussin with little relief.  Denies any sick contacts or COVID exposures.    Recently saw cardiology, had an echo.  Patient states  he was referred to have a stress test scheduled for October 31.Per Dr Keating - His is EF was down to 44%, and normal in the past.       Past Medical History:   Diagnosis Date   • Arthritis    • CAD (coronary artery disease)    • Congestive cardiac failure (CMS/HCC) 2008   • Essential (primary)  hypertension    • Heart attack (CMS/HCC)    • Hypercholesterolemia        Past Surgical History:   Procedure Laterality Date   • Anes transcatheter placement of an intravascular stents     • Ankle surgery     • Back surgery     • Cardiac catherization     • Hernia repair     • Joint replacement     • Spine surgery  2018   • Total knee arthroplasty Bilateral    • Vasectomy  1991       Social History     Socioeconomic History   • Marital status: /Civil Union     Spouse name: Not on file   • Number of children: Not on file   • Years of education: Not on file   • Highest education level: Not on file   Occupational History   • Occupation: retired   Tobacco Use   • Smoking status: Never Smoker   • Smokeless tobacco: Never Used   Vaping Use   • Vaping Use: never used   Substance and Sexual Activity   • Alcohol use: No   • Drug use: No   • Sexual activity: Not Currently     Partners: Female   Other Topics Concern   •  Service Not Asked   • Blood Transfusions Not Asked   • Caffeine Concern Not Asked   • Occupational Exposure Not Asked   • Hobby Hazards Not Asked   • Sleep Concern Not Asked   • Stress Concern Not Asked   • Weight Concern Not Asked   • Special Diet Not Asked   • Back Care Not Asked   • Exercise No     Comment: weight machine for 80 min   • Bike Helmet Not Asked   • Seat Belt Not Asked   • Self-Exams Not Asked   Social History Narrative   • Not on file     Social Determinants of Health     Financial Resource Strain: Not on file   Food Insecurity: Not on file   Transportation Needs: Not on file   Physical Activity: Not on file   Stress: Not on file   Social Connections: Not on file   Intimate Partner Violence: Not on file       Family History   Problem Relation Age of Onset   • Heart disease Mother    • Hypertension Mother    • Hyperlipidemia Mother    • Cancer Father         Lung   • Heart disease Sister    • Cancer Brother    • Heart disease Brother        ALLERGIES:   Allergen Reactions   • No  Known Allergies Other (See Comments)       Current Outpatient Medications   Medication Sig Dispense Refill   • Omega-3 Fatty Acids (OMEGA 3 PO)      • Ascorbic Acid (vitamin C) 1000 MG tablet every 24 hours.     • losartan (COZAAR) 25 MG tablet every 24 hours.     • losartan (COZAAR) 25 MG tablet Take 1 tablet by mouth daily. 90 tablet 3   • rosuvastatin (CRESTOR) 10 MG tablet Take 1 tablet by mouth daily. 90 tablet 3   • acetaminophen (TYLENOL) 325 MG tablet Take 2 tablets by mouth every 6 hours.     • nitroGLYcerin (NITROSTAT) 0.4 MG sublingual tablet Place 0.4 mg under the tongue as needed.     • Omega-3 Fatty Acids (OMEGA-3 FISH OIL) 1000 MG capsule Take 1 tablet by mouth daily.     • Ascorbic Acid (VITAMIN C) 1000 MG tablet Take 1 tablet by mouth daily.       No current facility-administered medications for this visit.       Review of Systems   Constitutional: Positive for fatigue. Negative for activity change, appetite change, chills, diaphoresis, fever and unexpected weight change.   HENT: Negative for congestion, dental problem, drooling, ear discharge, ear pain, facial swelling, hearing loss, mouth sores, nosebleeds, postnasal drip, rhinorrhea, sinus pressure, sinus pain, sneezing, sore throat, tinnitus, trouble swallowing and voice change.    Eyes: Negative for photophobia, pain, discharge, redness, itching and visual disturbance.   Respiratory: Positive for cough, shortness of breath and wheezing. Negative for apnea, choking, chest tightness and stridor.    Cardiovascular: Positive for palpitations. Negative for chest pain and leg swelling.   Gastrointestinal: Negative for abdominal distention, abdominal pain, anal bleeding, blood in stool, constipation, diarrhea, nausea, rectal pain and vomiting.   Endocrine: Negative for cold intolerance, heat intolerance, polydipsia and polyphagia.   Genitourinary: Negative for decreased urine volume, difficulty urinating, dysuria, enuresis, flank pain, frequency,  genital sores, hematuria and urgency.   Musculoskeletal: Negative for arthralgias, back pain, gait problem, joint swelling, myalgias, neck pain and neck stiffness.   Skin: Negative for color change, pallor, rash and wound.   Allergic/Immunologic: Negative for environmental allergies, food allergies and immunocompromised state.   Neurological: Negative for dizziness, tremors, seizures, syncope, facial asymmetry, speech difficulty, weakness, light-headedness, numbness and headaches.   Hematological: Negative for adenopathy. Does not bruise/bleed easily.   Psychiatric/Behavioral: Negative for agitation, behavioral problems, confusion, decreased concentration, dysphoric mood, hallucinations, self-injury and sleep disturbance. The patient is not nervous/anxious and is not hyperactive.        Visit Vitals  BP (!) 161/81 (BP Location: LUE - Left upper extremity, Patient Position: Sitting, Cuff Size: Regular)   Pulse 84   Temp 98.9 °F (37.2 °C) (Temporal)   Resp (!) 22   Ht 5' 11\" (1.803 m)   Wt 97.1 kg (214 lb)   SpO2 96%   BMI 29.85 kg/m²       Physical Exam  Vitals and nursing note reviewed.   Constitutional:       Appearance: He is well-developed.   HENT:      Head: Normocephalic and atraumatic.      Right Ear: Hearing, tympanic membrane, ear canal and external ear normal.      Left Ear: Hearing, tympanic membrane, ear canal and external ear normal.      Nose: Nose normal.      Right Sinus: No maxillary sinus tenderness or frontal sinus tenderness.      Left Sinus: No maxillary sinus tenderness or frontal sinus tenderness.      Mouth/Throat:      Lips: Pink.      Mouth: Mucous membranes are moist.      Pharynx: Oropharynx is clear. Uvula midline. No pharyngeal swelling, oropharyngeal exudate, posterior oropharyngeal erythema or uvula swelling.      Tonsils: No tonsillar exudate or tonsillar abscesses. 0 on the right. 0 on the left.      Comments: NO DROOLING. NO VESICLES, ULCERATION, INFLAMMATION. NO FULLNESS OR BULGING  OF SOFT PALATE. NO PALATAL ULCERS OR PETECHIAE. UVULA MIDLINE WITH NO ERYTHEMA OR EDEMA. NO PAIN ON PALPATION OF JAW OR TMJ.     Neck: Normal range of motion and neck supple.   Eyes:      General:         Right eye: No discharge.         Left eye: No discharge.      Conjunctiva/sclera: Conjunctivae normal.      Pupils: Pupils are equal, round, and reactive to light.   Neck:      Thyroid: No thyromegaly.      Vascular: No JVD.      Trachea: No tracheal deviation.   Cardiovascular:      Rate and Rhythm: Normal rate and regular rhythm.      Heart sounds: Normal heart sounds. No murmur heard.    No friction rub. No gallop.      Comments: Trace swelling to left lower leg. No right lower leg swelling.   Pulmonary:      Effort: Tachypnea and accessory muscle usage present. No respiratory distress.      Breath sounds: Normal breath sounds. No stridor. No decreased breath sounds, wheezing, rhonchi or rales.      Comments: + Conversational dyspnea   Walking o2 95%, HR 92  Chest:      Chest wall: No tenderness.   Abdominal:      General: Bowel sounds are normal. There is no distension.      Palpations: Abdomen is soft. There is no mass.      Tenderness: There is no abdominal tenderness. There is no guarding or rebound.      Hernia: No hernia is present.   Musculoskeletal:         General: No tenderness or deformity. Normal range of motion.   Lymphadenopathy:      Cervical: No cervical adenopathy.   Skin:     General: Skin is warm.      Capillary Refill: Capillary refill takes less than 2 seconds.      Coloration: Skin is not pale.      Findings: No erythema or rash.   Neurological:      Mental Status: He is alert and oriented to person, place, and time.      Cranial Nerves: No cranial nerve deficit.      Sensory: No sensory deficit.      Motor: No abnormal muscle tone.      Coordination: Coordination normal.      Deep Tendon Reflexes: Reflexes normal.   Psychiatric:         Behavior: Behavior normal.         Thought Content:  Thought content normal.         Judgment: Judgment normal.         Assessment & Plan:    Acute cough  (primary encounter diagnosis)  Plan: POCT COVID/FLU/RSV PANEL    Shortness of breath  Plan: POCT COVID/FLU/RSV PANEL        COVID/FLU/RSV NEGATIVE     Due to recent trauma, and worsening shortness of breath with cough will send to [facility] ER for further evaluation and treatment.  Patient agreeable will drive self.  Declined EMS.  Risks reviewed.  [facility] bed command notified.    Discussion:      Patient was discharged in a stable condition and was in agreement with the plan.  No further questions.      Deena SOUSA   No lesions; no rash

## 2023-01-04 ENCOUNTER — NON-APPOINTMENT (OUTPATIENT)
Age: 63
End: 2023-01-04

## 2023-01-04 ENCOUNTER — APPOINTMENT (OUTPATIENT)
Dept: INTERNAL MEDICINE | Facility: CLINIC | Age: 63
End: 2023-01-04
Payer: COMMERCIAL

## 2023-01-04 VITALS — SYSTOLIC BLOOD PRESSURE: 130 MMHG | DIASTOLIC BLOOD PRESSURE: 80 MMHG

## 2023-01-04 DIAGNOSIS — G89.29 DORSALGIA, UNSPECIFIED: ICD-10-CM

## 2023-01-04 DIAGNOSIS — M54.9 DORSALGIA, UNSPECIFIED: ICD-10-CM

## 2023-01-04 DIAGNOSIS — H66.90 OTITIS MEDIA, UNSPECIFIED, UNSPECIFIED EAR: ICD-10-CM

## 2023-01-04 PROCEDURE — 99213 OFFICE O/P EST LOW 20 MIN: CPT

## 2023-01-04 NOTE — REVIEW OF SYSTEMS
[Earache] : earache [Fever] : no fever [Chills] : no chills [Sore Throat] : no sore throat [Cough] : no cough

## 2023-01-04 NOTE — ASSESSMENT
[FreeTextEntry1] : URI/otitis–the left TM is somewhat erythematous.  He does have previous injury to the TM with previous perforation approximately 30 years ago.  Also seems to have chronic eustachian tube dysfunction issues.\par Given Zithromax Z-Rodrigo to take over the next 5 days.\par Will also use Medrol 16 mg daily for 3-5 days.\par \par Chronic back pain/arthritis–he has been following with a pain specialist and continues to use oxycodone 10 mg as needed.  An electrical stimulator was also suggested and he is considering this.

## 2023-01-04 NOTE — HISTORY OF PRESENT ILLNESS
[FreeTextEntry8] : 61 y/o male presents for bilateral ear congestion, pressure with crackling over the past 2 weeks.  He denies any other typical URI symptoms.  No fever/chills.

## 2023-01-04 NOTE — PHYSICAL EXAM
[No Acute Distress] : no acute distress [Normal Oropharynx] : the oropharynx was normal [No Lymphadenopathy] : no lymphadenopathy [No Respiratory Distress] : no respiratory distress  [Clear to Auscultation] : lungs were clear to auscultation bilaterally [de-identified] : Left TM with area of erythema.

## 2023-03-01 ENCOUNTER — APPOINTMENT (OUTPATIENT)
Dept: INTERNAL MEDICINE | Facility: CLINIC | Age: 63
End: 2023-03-01
Payer: COMMERCIAL

## 2023-03-01 VITALS
WEIGHT: 280 LBS | SYSTOLIC BLOOD PRESSURE: 138 MMHG | DIASTOLIC BLOOD PRESSURE: 90 MMHG | BODY MASS INDEX: 35.94 KG/M2 | HEIGHT: 74 IN

## 2023-03-01 DIAGNOSIS — R73.03 PREDIABETES.: ICD-10-CM

## 2023-03-01 DIAGNOSIS — E66.9 OBESITY, UNSPECIFIED: ICD-10-CM

## 2023-03-01 PROCEDURE — 99213 OFFICE O/P EST LOW 20 MIN: CPT

## 2023-03-01 NOTE — PHYSICAL EXAM
[No Acute Distress] : no acute distress [Normal Oropharynx] : the oropharynx was normal [No Lymphadenopathy] : no lymphadenopathy [No Respiratory Distress] : no respiratory distress  [Normal Rate] : normal rate  [Regular Rhythm] : with a regular rhythm

## 2023-03-01 NOTE — ASSESSMENT
[FreeTextEntry1] : Obesity/prediabetes–we did discuss weight loss options including medications.  He is familiar with Wegovy and is interested in trying that.\par He will start Wegovy 0.25 mg weekly and call in 1 month to increase the dose if tolerated and effective.

## 2023-03-01 NOTE — REVIEW OF SYSTEMS
[Recent Change In Weight] : ~T recent weight change [Chest Pain] : no chest pain [Abdominal Pain] : no abdominal pain

## 2023-03-01 NOTE — HISTORY OF PRESENT ILLNESS
[de-identified] : Presents with ongoing issues with weight gain and difficulty losing.  He knows he is much more sedentary because of ongoing lower back issues.  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  He is asking about options.

## 2023-03-01 NOTE — HEALTH RISK ASSESSMENT
[Yes] : Yes [2 - 4 times a month (2 pts)] : 2-4 times a month (2 points) [1 or 2 (0 pts)] : 1 or 2 (0 points) [Never (0 pts)] : Never (0 points) [No] : In the past 12 months have you used drugs other than those required for medical reasons? No [0] : 2) Feeling down, depressed, or hopeless: Not at all (0) [PHQ-2 Negative - No further assessment needed] : PHQ-2 Negative - No further assessment needed [Former] : Former [10-14] : 10-14 [> 15 Years] : > 15 Years [Audit-CScore] : 2

## 2023-04-13 ENCOUNTER — RX RENEWAL (OUTPATIENT)
Age: 63
End: 2023-04-13

## 2023-05-13 ENCOUNTER — RX RENEWAL (OUTPATIENT)
Age: 63
End: 2023-05-13

## 2023-05-16 ENCOUNTER — RX RENEWAL (OUTPATIENT)
Age: 63
End: 2023-05-16

## 2023-06-20 ENCOUNTER — RX RENEWAL (OUTPATIENT)
Age: 63
End: 2023-06-20

## 2023-06-20 RX ORDER — TADALAFIL 10 MG/1
10 TABLET, FILM COATED ORAL
Qty: 90 | Refills: 3 | Status: ACTIVE | COMMUNITY
Start: 2021-03-17 | End: 1900-01-01

## 2023-08-29 ENCOUNTER — APPOINTMENT (OUTPATIENT)
Dept: INTERNAL MEDICINE | Facility: CLINIC | Age: 63
End: 2023-08-29
Payer: COMMERCIAL

## 2023-08-29 VITALS
BODY MASS INDEX: 32.08 KG/M2 | DIASTOLIC BLOOD PRESSURE: 74 MMHG | SYSTOLIC BLOOD PRESSURE: 110 MMHG | HEIGHT: 74 IN | WEIGHT: 250 LBS

## 2023-08-29 DIAGNOSIS — K52.9 NONINFECTIVE GASTROENTERITIS AND COLITIS, UNSPECIFIED: ICD-10-CM

## 2023-08-29 PROCEDURE — 99214 OFFICE O/P EST MOD 30 MIN: CPT

## 2023-08-29 RX ORDER — LEVOFLOXACIN 500 MG/1
500 TABLET, FILM COATED ORAL
Qty: 7 | Refills: 0 | Status: ACTIVE | COMMUNITY
Start: 2023-08-29 | End: 1900-01-01

## 2023-08-29 NOTE — PHYSICAL EXAM
[No Acute Distress] : no acute distress [No Respiratory Distress] : no respiratory distress  [Normal Rate] : normal rate  [Regular Rhythm] : with a regular rhythm [Soft] : abdomen soft [Non-distended] : non-distended [de-identified] : Minimal bilateral lower quadrant tenderness.  There is no upper abdominal tenderness.

## 2023-08-29 NOTE — REVIEW OF SYSTEMS
[Fever] : no fever [Chills] : no chills [Chest Pain] : no chest pain [Shortness Of Breath] : no shortness of breath [Abdominal Pain] : abdominal pain [Nausea] : no nausea [Diarrhea] : diarrhea [Vomiting] : no vomiting

## 2023-08-29 NOTE — ASSESSMENT
[FreeTextEntry1] : Enteritis–does have ongoing lower abdominal discomfort with bloating and diarrhea.  It has been intermittent.  No fever or chills and he feels generally well otherwise. He is going to remain off work over the time being.  He was told does not seem to be Wegovy related at this point. He is going to bring a stool sample to the lab for stool studies. He will start Levaquin 500 mg daily for the next 7 days.  He will call next few days with his progress. Was told if he has any significant worsening of his symptoms especially if he develops any fever/chills or increasing abdominal pain he should go to the emergency room for further evaluation.

## 2023-10-01 PROBLEM — K52.9 ENTERITIS: Status: ACTIVE | Noted: 2023-08-29

## 2023-10-24 RX ORDER — SEMAGLUTIDE 0.25 MG/.5ML
0.25 INJECTION, SOLUTION SUBCUTANEOUS
Qty: 1 | Refills: 1 | Status: DISCONTINUED | COMMUNITY
Start: 2023-03-01 | End: 2023-10-24

## 2024-01-22 ENCOUNTER — APPOINTMENT (OUTPATIENT)
Dept: INTERNAL MEDICINE | Facility: CLINIC | Age: 64
End: 2024-01-22
Payer: COMMERCIAL

## 2024-01-22 VITALS
SYSTOLIC BLOOD PRESSURE: 116 MMHG | HEIGHT: 74 IN | DIASTOLIC BLOOD PRESSURE: 74 MMHG | WEIGHT: 252 LBS | BODY MASS INDEX: 32.34 KG/M2

## 2024-01-22 DIAGNOSIS — N20.0 CALCULUS OF KIDNEY: ICD-10-CM

## 2024-01-22 LAB
BILIRUB UR QL STRIP: NEGATIVE
CLARITY UR: CLEAR
COLLECTION METHOD: NORMAL
GLUCOSE UR-MCNC: NEGATIVE
HCG UR QL: 0.2 EU/DL
HGB UR QL STRIP.AUTO: NORMAL
KETONES UR-MCNC: NEGATIVE
LEUKOCYTE ESTERASE UR QL STRIP: NEGATIVE
NITRITE UR QL STRIP: NEGATIVE
PH UR STRIP: 6
PROT UR STRIP-MCNC: NORMAL
SP GR UR STRIP: 1.03

## 2024-01-22 PROCEDURE — 81002 URINALYSIS NONAUTO W/O SCOPE: CPT

## 2024-01-22 PROCEDURE — 99214 OFFICE O/P EST MOD 30 MIN: CPT

## 2024-01-22 RX ORDER — SEMAGLUTIDE 2.4 MG/.75ML
2.4 INJECTION, SOLUTION SUBCUTANEOUS
Qty: 3 | Refills: 0 | Status: ACTIVE | COMMUNITY
Start: 2023-05-13 | End: 1900-01-01

## 2024-01-22 RX ORDER — METHYLPREDNISOLONE 8 MG/1
8 TABLET ORAL
Qty: 100 | Refills: 0 | Status: ACTIVE | COMMUNITY
Start: 2024-01-22 | End: 1900-01-01

## 2024-01-22 RX ORDER — TAMSULOSIN HYDROCHLORIDE 0.4 MG/1
0.4 CAPSULE ORAL
Qty: 30 | Refills: 1 | Status: ACTIVE | COMMUNITY
Start: 2024-01-22 | End: 1900-01-01

## 2024-01-22 NOTE — PHYSICAL EXAM
[No Acute Distress] : no acute distress [No Respiratory Distress] : no respiratory distress  [Normal Rate] : normal rate  [Non Tender] : non-tender [Regular Rhythm] : with a regular rhythm [No CVA Tenderness] : no CVA  tenderness

## 2024-01-22 NOTE — ASSESSMENT
[FreeTextEntry1] : Probable left-sided kidney/ureteral stone-urinalysis does not reveal any evidence of infection.  Discussed that the location of the pain at this point most likely means that the stone is in the lower ureter and will most likely pass spontaneously.  Pain has been manageable to this point. Given Flomax 0.4 mg daily which she has taken in the past. ER if there is any significant increase in his symptoms.

## 2024-01-22 NOTE — HISTORY OF PRESENT ILLNESS
[FreeTextEntry8] : Presents with a 1 week history of intermittent episodes of left flank pain that now radiates into the left groin area.  Does have a history of stones in the past.  The last episode was approximately a year and a half ago.  Has had chills but no fever.  Did notice some slight hematuria 2 days ago but not since.

## 2024-01-22 NOTE — REVIEW OF SYSTEMS
[Fever] : no fever [Chills] : chills [Abdominal Pain] : abdominal pain [Dysuria] : no dysuria [Hematuria] : hematuria

## 2024-03-04 NOTE — H&P PST ADULT - ANESTHESIA, PREVIOUS REACTION, PROFILE
Patient ID: Tao Renee is a 75 y.o. male    Primary Care Provider: Rubio Tariq DO    DIAGNOSIS AND STAGING  Multifocal adenocarcinoma of the lung -  Three different genomic profiles seen in specimens from 2020 and 2024  In 2024, 2 tumors in different lobes (RUL and RLL) harboring a KRAS G12C mutation (possibly mpT4 pN0 M0).          SITES OF DISEASE  RUL and RLL      MOLECULAR GENOMICS    From 02/27/2020 biopsy of RUL:  PD-L1 TPS 10%     MICROSATELLITE STATUS: Microsatellite Stable (NAYA)     DISEASE ASSOCIATED GENOMIC FINDINGS:  CDKN2A p.B995God*8 (NM_000077: c.332dupG)  CTNNB1 p.S37F (NM_001904: c.110C>T), subclonal  EGFR p.G719D (NM_005228: c.2156G>A)  EGFR p.S768I (NM_005228: c.2303G>T)       INTERPRETATION AND POTENTIAL THERAPEUTIC OPTIONS:  EGFR p.G719D and p.S768I   FDA RECOMMENDATIONS: Afatinib (NSCLC)    From RUL lobectomy and RLL wedge resection in 01/2024:   Specimen: FFPE, [id number] H7 (1.2 cm adenocarcinoma RUL)   Estimated Tumor Content: 20%  PD-L1 TPS 90%     MICROSATELLITE STATUS: Cannot Be Determined.     DISEASE ASSOCIATED GENOMIC FINDINGS:   KRAS p.G12C (NM_033360 c.34G>T)     INTERPRETATION AND POTENTIAL THERAPEUTIC OPTIONS:  KRAS p.G12C  FDA RECOMMENDATIONS (Advanced Non Small Cell Lung Cancer):  adagrasib (Subsequent)  sotorasib (Subsequent)Specimen: FFPE, [id number] [id number]    Specimen:  FFPE, [id number] H5  (1.4 cm adenocarcinoma)  (PD-L1 TPS < 1%     MICROSATELLITE STATUS: Microsatellite Instability-High (MSI-H) is NOT DETECTED.  DISEASE ASSOCIATED GENOMIC FINDINGS: Not Detected.    Specimen: FFPE, [id number] G5 (2.4 cm adenocarcinoma)  Estimated Tumor Content: 30%     MICROSATELLITE STATUS: Microsatellite Instability-High (MSI-H) is NOT DETECTED.     DISEASE ASSOCIATED GENOMIC FINDINGS:   KRAS p.G12C (NM_033360 c.34G>T)     INTERPRETATION AND POTENTIAL THERAPEUTIC OPTIONS:  KRAS p.G12C  FDA RECOMMENDATIONS (Advanced Non Small Cell Lung Cancer):  adagrasib (Subsequent)  sotorasib  (Subsequent)       PRIOR THERAPIES  2016: RML lobectomy for gZ7wlO0 well differentiated adenocarcinoma  2020: Hypofractionated RT (60 Gy over 15f) to the RUL due to close proximity to esophagus/trachea     CURRENT THERAPY  Recommended 4 cycles of adjuvant platinum/pemetrexed followed by 1 year of consolidative pembrolizumab     CURRENT ONCOLOGICAL PROBLEMS  None      HISTORY OF PRESENT ILLNESS  This is a former 60 pack-year smoker (quit 2001) who underwent a RML lobectomy on 06/09/2016 for pT1bN0 well differentiated adenocarcinoma. In 2020 he developed a new RUL nodule that was biopsied and found to be a new lepidic well differentiated invasive adenocarcinoma. A PET scan obtained on 03/05/20 showed no evidence of extra-thoracic disease and he was treated with hypofractionated radiation from 4/21/2020 to 5/11/2020.     He continued to be followed by his surgeon, Dr. Stephan Bronson. Most recently he noted a change in the previously treated RUL adenocarcinoma with RT and a bronchoscopy was performed on 12/08/23:  Final Cytological Interpretation   A. LUNG FINE NEEDLE ASPIRATION RIGHT LOWER LOBE - RLL nodule                Nondiagnostic specimen due to insufficient cellularity                Blood and bronchial cells.  B. BRONCHIAL BRUSH RIGHT LOWER LOBE                 Nondiagnostic specimen due to insufficient cellularity                Blood and bronchial cells.  C. LYMPH NODE 4 R PULMONARY FINE NEEDLE ASPIRATION                 Malignant cells derived from well differentiated adenocarcinoma  Molecular testing has been ordered and results will be issued in a separate report.  The cell block contains high tumor cellularity representing roughly 30% of all nucleated cells.   D. LYMPH NODE 7 PULMONARY FINE NEEDLE ASPIRATION                 No malignant cells identified                Lymphoid sample  E. LYMPH NODE 11 Rs PULMONARY FINE NEEDLE ASPIRATION                 No malignant cells identified                 Lymphoid sample  F. LUNG FINE NEEDLE ASPIRATION RIGHT UPPER LOBE - RUL Nodule                Rare cluster of atypical cells, favor squamous metaplasia.                Lymphoid sample                     Case was presented at [facility] on 12/23/23 and recommended to proceed with surgical resection:    FINAL DIAGNOSIS   A. LYMPH NODE, LEVEL 9; BIOPSY:   -- One lymph node, negative for metastasis (0/1)     B. LYMPH NODE, LEVEL 7; BIOPSY:   -- One lymph node, negative for metastasis (0/1)     C. LYMPH NODE, LEVEL 11RS; BIOPSY:   -- One lymph node, negative for metastasis (0/1)     D. LYMPH NODE, LEVEL 10; BIOPSY:   -- One lymph node, negative for metastasis (0/1)     E. LYMPH NODE, LEVEL 11RI; BIOPSY:   -- One lymph node, negative for metastasis (0/1)     F. LYMPH NODE, LEVEL 12; BIOPSY:    -- One lymph node, negative for metastasis (0/1)     G. LUNG, RIGHT LOWER LOBE; SUPERIOR SEGMENTECTOMY:   -- Adenocarcinoma, 90% acinar and 10% lepidic patterns.  -- Tumor size: 2.7 cm in greatest dimension  -- No lymphovascular or pleural invasion is identified  -- Margin is free of tumor (but less than 1 mm far from the tumor)  NGS showed a KRAS G12C mutation - PD-L1 TPS 90%     H. LUNG, RIGHT UPPER LOBE; LOBECTOMY:   Three separate foci of adenocarcinomas:     Tumor#1              -- Adenocarcinoma, acinar 60% and lepidic 40% patterns              -- Tumor size: 1.0 cm in greatest dimension              -- No lymphovascular or pleural invasion identified  H4 - NGS not performed - PD-L1 TPS < 1%     Tumor#2              -- Adenocarcinoma, papillary 60%, acinar 30%, micropapillary 5%, and lepidic 5% patterns              -- Tumor size:1.4 cm in greatest dimension              -- No lymphovascular or pleural invasion identified  H5 NGS showed no disease relevant alterations - PD-L1 TPS < 1%     Tumor#3              -- Adenocarcinoma, acinar 50%, papillary %30 and lepidic 20% patterns              -- Tumor size: 1.2 cm in greatest  dimension              -- No lymphovascular or pleural invasion identified  H7 showed a PRODX51E mutation - PD-L1 TPS < 1%     -- Resection margins are free of tumor  -- Twelve lymph nodes, negative for metastasis (0/13)     I. LYMPH NODE, LEVEL 4R; BIOPSY:   -- One lymph node, negative for metastasis (0/1)           PAST MEDICAL HISTORY  Diverticulitis     SURGICAL HISTORY  RML lobectomy in 2016   Right VATS and RUL lobectomy and RLL wedge resection in 01/2024     SOCIAL HISTORY  Smoked 2 PPD x30 years, quit in 2001.   Drinks 2-3 nights a week. No illicit drug use.   Born in [place], now lives in [facility] [place]. Worked in a steel mill, retired in 2001. Worked at a [employer] and works at [employer]. Lives alone, 2 brothers and 1 sisters all local. No kids. Hindu.      FAMILY HISTORY  No history of cancer     CURRENT MEDS REVIEWED       ALLERGIES REVIEWED        SUBJECTIVE:  Here after his VATS -   Feels weak but on his road to full recovery   Here to discuss potential adjuvant chemotherapy and immunotherapy   Has lost 4 Kg since surgery     A 13 point review of systems was performed, with significant findings documented above in subjective history.    OBJECTIVE:  Vitals:    02/29/24 1618   BP: 151/69   Pulse: 89   Resp: 20   Temp: 36.5 °C (97.7 °F)   SpO2: 97%      Body surface area is 2.38 meters squared.     Wt Readings from Last 5 Encounters:   02/29/24 107 kg (236 lb 5.3 oz)   02/19/24 109 kg (240 lb)   02/07/24 109 kg (240 lb 3.2 oz)   01/29/24 110 kg (243 lb 3.2 oz)   01/16/24 111 kg (245 lb)       ECOGSCORE: 1    Physical Exam  Constitutional:       Appearance: Normal appearance.   HENT:      Head: Normocephalic and atraumatic.   Eyes:      General: No scleral icterus.     Extraocular Movements: Extraocular movements intact.      Conjunctiva/sclera: Conjunctivae normal.   Cardiovascular:      Rate and Rhythm: Normal rate and regular rhythm.   Pulmonary:      Effort: Pulmonary  "effort is normal.      Breath sounds: Normal breath sounds.   Abdominal:      Palpations: Abdomen is soft. There is no mass.      Tenderness: There is no abdominal tenderness. There is no guarding.   Musculoskeletal:      Right lower leg: No edema.      Left lower leg: No edema.   Skin:     General: Skin is warm.      Findings: No erythema or rash.   Neurological:      General: No focal deficit present.      Mental Status: He is alert and oriented to person, place, and time.      Motor: No weakness.   Psychiatric:         Mood and Affect: Mood normal.         Behavior: Behavior normal.         Thought Content: Thought content normal.         Judgment: Judgment normal.          Diagnostic Results   Results:  Labs:  Lab Results   Component Value Date    WBC 8.5 01/28/2024    HGB 12.7 (L) 01/28/2024    HCT 39.4 (L) 01/28/2024    MCV 88 01/28/2024     01/28/2024      Lab Results   Component Value Date    NEUTROABS 6.56 01/05/2020        Lab Results   Component Value Date    GLUCOSE 91 01/28/2024    CALCIUM 8.8 01/28/2024     01/28/2024    K 4.1 01/28/2024    CO2 21 01/28/2024     01/28/2024    BUN 14 01/28/2024    CREATININE 0.99 01/28/2024    MG 2.07 01/28/2024     Lab Results   Component Value Date    ALT 13 11/29/2023    AST 10 11/29/2023    ALKPHOS 66 11/29/2023    BILITOT 0.7 11/29/2023    BILIDIR 0.0 11/04/2022      No results found for: \"ACTH\", \"CORTISOL\", \"TSH\", \"FREET4\"      No images are attached to the encounter or orders placed in the encounter.     Assessment/Plan   Mr. Tao Renee is a 75 y.o. male with history cigarette smoking and of multifocal adenocarcinoma of the lung -   He underwent a RML lobectomy on 06/09/2016 for pT1bN0 well differentiated adenocarcinoma. In 2020 he developed a new RUL nodule that was biopsied and found to be a new lepidic well differentiated invasive adenocarcinoma, with non-classic EGFR mutation. He was treated with hypofractionated radiation from " 4/21/2020 to 5/11/2020.   Most recently developed new and worsening nodules in RUL and RLL and underwent a RUL lobectomy and RLL wedge resection on 01/22/24. While the genomic profile of these tumors differed from prior in 2020, indicating distinct neoplasms, two of the nodules, one on the RUL and the RLL nodule shared a [id number] mutation, which is concerning for a mpT4 N0 disease.   I had a long discussion with the patient today regarding the potential use of adjuvant systemic therapy consisting of 4 cycles of chemotherapy followed by one year of adjuvant immunotherapy. The largest tumor in the RLL has PD-L1 TPS 90%.   The patient received information to review at home and will schedule a follow-up with us to discuss it further.   I would recommend a Brain MRI at this point to complete staging.     This note was created with the assistance of a speech recognition program.  Although the intention is to generate a document that actually reflects the content of the visit, it is possible that some mistakes occur and may not be corrected by the time of completion of this note.        Argenis Arguelles MD, MS  Thoracic Medical Oncology   [address] [place], [place] [address]  Phone: [phone]   none

## 2024-07-03 ENCOUNTER — APPOINTMENT (OUTPATIENT)
Dept: INTERNAL MEDICINE | Facility: CLINIC | Age: 64
End: 2024-07-03
Payer: COMMERCIAL

## 2024-07-03 VITALS
WEIGHT: 251 LBS | HEIGHT: 74 IN | BODY MASS INDEX: 32.21 KG/M2 | DIASTOLIC BLOOD PRESSURE: 72 MMHG | SYSTOLIC BLOOD PRESSURE: 124 MMHG

## 2024-07-03 DIAGNOSIS — G47.00 INSOMNIA, UNSPECIFIED: ICD-10-CM

## 2024-07-03 DIAGNOSIS — E66.9 OBESITY, UNSPECIFIED: ICD-10-CM

## 2024-07-03 DIAGNOSIS — K13.70 UNSPECIFIED LESIONS OF ORAL MUCOSA: ICD-10-CM

## 2024-07-03 DIAGNOSIS — Z12.11 ENCOUNTER FOR SCREENING FOR MALIGNANT NEOPLASM OF COLON: ICD-10-CM

## 2024-07-03 DIAGNOSIS — Z00.00 ENCOUNTER FOR GENERAL ADULT MEDICAL EXAMINATION W/OUT ABNORMAL FINDINGS: ICD-10-CM

## 2024-07-03 DIAGNOSIS — L98.9 DISORDER OF THE SKIN AND SUBCUTANEOUS TISSUE, UNSPECIFIED: ICD-10-CM

## 2024-07-03 PROCEDURE — 36415 COLL VENOUS BLD VENIPUNCTURE: CPT

## 2024-07-03 PROCEDURE — 99396 PREV VISIT EST AGE 40-64: CPT

## 2024-07-03 RX ORDER — DIAZEPAM 5 MG/1
5 TABLET ORAL
Qty: 60 | Refills: 0 | Status: ACTIVE | COMMUNITY
Start: 2024-07-03 | End: 1900-01-01

## 2024-07-04 LAB
ALBUMIN SERPL ELPH-MCNC: 4.5 G/DL
ALP BLD-CCNC: 61 U/L
ALT SERPL-CCNC: 20 U/L
ANION GAP SERPL CALC-SCNC: 14 MMOL/L
AST SERPL-CCNC: 21 U/L
BASOPHILS # BLD AUTO: 0.02 K/UL
BASOPHILS NFR BLD AUTO: 0.4 %
BILIRUB SERPL-MCNC: 0.6 MG/DL
BUN SERPL-MCNC: 21 MG/DL
CALCIUM SERPL-MCNC: 10.4 MG/DL
CHLORIDE SERPL-SCNC: 108 MMOL/L
CHOLEST SERPL-MCNC: 162 MG/DL
CO2 SERPL-SCNC: 22 MMOL/L
CREAT SERPL-MCNC: 1 MG/DL
EGFR: 85 ML/MIN/1.73M2
EOSINOPHIL # BLD AUTO: 0.16 K/UL
EOSINOPHIL NFR BLD AUTO: 3 %
ESTIMATED AVERAGE GLUCOSE: 120 MG/DL
GLUCOSE SERPL-MCNC: 103 MG/DL
HBA1C MFR BLD HPLC: 5.8 %
HCT VFR BLD CALC: 40.7 %
HDLC SERPL-MCNC: 47 MG/DL
HGB BLD-MCNC: 13.1 G/DL
IMM GRANULOCYTES NFR BLD AUTO: 0.4 %
LDLC SERPL CALC-MCNC: 95 MG/DL
LYMPHOCYTES # BLD AUTO: 1.49 K/UL
LYMPHOCYTES NFR BLD AUTO: 28.2 %
MAN DIFF?: NORMAL
MCHC RBC-ENTMCNC: 28.2 PG
MCHC RBC-ENTMCNC: 32.2 GM/DL
MCV RBC AUTO: 87.5 FL
MONOCYTES # BLD AUTO: 0.38 K/UL
MONOCYTES NFR BLD AUTO: 7.2 %
NEUTROPHILS # BLD AUTO: 3.22 K/UL
NEUTROPHILS NFR BLD AUTO: 60.8 %
NONHDLC SERPL-MCNC: 114 MG/DL
PLATELET # BLD AUTO: 120 K/UL
POTASSIUM SERPL-SCNC: 4.4 MMOL/L
PROT SERPL-MCNC: 6.6 G/DL
PSA SERPL-MCNC: 1.09 NG/ML
RBC # BLD: 4.65 M/UL
RBC # FLD: 14.6 %
SODIUM SERPL-SCNC: 145 MMOL/L
TRIGL SERPL-MCNC: 104 MG/DL
TSH SERPL-ACNC: 1.08 UIU/ML
WBC # FLD AUTO: 5.29 K/UL

## 2024-07-10 ENCOUNTER — TRANSCRIPTION ENCOUNTER (OUTPATIENT)
Age: 64
End: 2024-07-10

## 2024-07-17 ENCOUNTER — NON-APPOINTMENT (OUTPATIENT)
Age: 64
End: 2024-07-17

## 2024-07-18 ENCOUNTER — APPOINTMENT (OUTPATIENT)
Dept: OTOLARYNGOLOGY | Facility: CLINIC | Age: 64
End: 2024-07-18
Payer: COMMERCIAL

## 2024-07-18 VITALS — HEIGHT: 74 IN | BODY MASS INDEX: 32.21 KG/M2 | WEIGHT: 251 LBS

## 2024-07-18 DIAGNOSIS — H72.92 UNSPECIFIED PERFORATION OF TYMPANIC MEMBRANE, LEFT EAR: ICD-10-CM

## 2024-07-18 DIAGNOSIS — K13.70 UNSPECIFIED LESIONS OF ORAL MUCOSA: ICD-10-CM

## 2024-07-18 PROCEDURE — 99203 OFFICE O/P NEW LOW 30 MIN: CPT

## 2024-09-30 ENCOUNTER — APPOINTMENT (OUTPATIENT)
Dept: OTOLARYNGOLOGY | Facility: CLINIC | Age: 64
End: 2024-09-30
Payer: COMMERCIAL

## 2024-09-30 VITALS — BODY MASS INDEX: 32.21 KG/M2 | WEIGHT: 251 LBS | HEIGHT: 74 IN

## 2024-09-30 PROCEDURE — 40812 EXCISE/REPAIR MOUTH LESION: CPT

## 2024-09-30 RX ORDER — CEFUROXIME AXETIL 500 MG/1
500 TABLET ORAL
Qty: 14 | Refills: 1 | Status: ACTIVE | COMMUNITY
Start: 2024-09-30 | End: 1900-01-01
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
## 2024-09-30 NOTE — PROCEDURE
[FreeTextEntry2] : oral lesion [FreeTextEntry3] : Procedure:  Excision of raised mass of left buccal area, reconstruction of defect measuring 5 x 8 mm with plastic advancement flap closure.  Anesthesia: Local with Xylocaine 1% with 100,000 epinephrine total 3 cc  Estimated blood loss: minimal  Description of operative procedure: With the patient in a reclining position and local anesthesia achieved, the area for excision was marked off. An elliptical incision was then placed in a favorable plane. Sharp dissection was carried through the dermis into the underlying adipose tissue. A narrow margin was obtained. Hemostasis was obtained.  The defect is 10 mm and was closed w interupted 3-0 vicryl.The procedure was then terminated.

## 2024-10-01 ENCOUNTER — LABORATORY RESULT (OUTPATIENT)
Age: 64
End: 2024-10-01

## 2024-10-04 ENCOUNTER — NON-APPOINTMENT (OUTPATIENT)
Age: 64
End: 2024-10-04

## 2024-10-09 ENCOUNTER — APPOINTMENT (OUTPATIENT)
Dept: GASTROENTEROLOGY | Facility: CLINIC | Age: 64
End: 2024-10-09
Payer: COMMERCIAL

## 2024-10-09 VITALS
BODY MASS INDEX: 32.21 KG/M2 | HEIGHT: 74 IN | WEIGHT: 251 LBS | SYSTOLIC BLOOD PRESSURE: 130 MMHG | DIASTOLIC BLOOD PRESSURE: 80 MMHG

## 2024-10-09 DIAGNOSIS — Z12.11 ENCOUNTER FOR SCREENING FOR MALIGNANT NEOPLASM OF COLON: ICD-10-CM

## 2024-10-09 PROCEDURE — 99203 OFFICE O/P NEW LOW 30 MIN: CPT

## 2024-10-09 RX ORDER — SODIUM SULFATE, POTASSIUM SULFATE AND MAGNESIUM SULFATE 1.6; 3.13; 17.5 G/177ML; G/177ML; G/177ML
17.5-3.13-1.6 SOLUTION ORAL
Qty: 1 | Refills: 0 | Status: ACTIVE | COMMUNITY
Start: 2024-10-09 | End: 1900-01-01

## 2024-10-10 ENCOUNTER — APPOINTMENT (OUTPATIENT)
Dept: DERMATOLOGY | Facility: CLINIC | Age: 64
End: 2024-10-10
Payer: COMMERCIAL

## 2024-10-10 DIAGNOSIS — L82.0 INFLAMED SEBORRHEIC KERATOSIS: ICD-10-CM

## 2024-10-10 DIAGNOSIS — I78.1 NEVUS, NON-NEOPLASTIC: ICD-10-CM

## 2024-10-10 DIAGNOSIS — L81.4 OTHER MELANIN HYPERPIGMENTATION: ICD-10-CM

## 2024-10-10 PROCEDURE — 99203 OFFICE O/P NEW LOW 30 MIN: CPT | Mod: 25

## 2024-10-10 PROCEDURE — 17110 DESTRUCTION B9 LES UP TO 14: CPT

## 2024-11-18 NOTE — ED PROVIDER NOTE - CLINICAL SUMMARY MEDICAL DECISION MAKING FREE TEXT BOX
Pt ambulatory to ED with c/o visual hallucinations. Pt says the voices in his head are telling him things, he is just unsure of what they're saying. Denies pain, visual hallucinations, SI/HI.     Patient changed into gown by self. All belongings collected, inventoried, and secured by public safety. Public safety standby initiated. Restriction of rights complete.   
Pt to unit 631 w/ Yuli, RN and PS via wheelchair.  
Wrapup given to Belgica JOHNS. Pt will get picked up shortly after shift change.  
Pt hypoxic, 88% on room air with COVID. Rule out PE, check labs, XR, give IV Decadron and admit to hospital

## 2024-12-26 DIAGNOSIS — J32.9 CHRONIC SINUSITIS, UNSPECIFIED: ICD-10-CM

## 2024-12-26 RX ORDER — GUAIFENESIN AND CODEINE PHOSPHATE 10; 100 MG/5ML; MG/5ML
100-10 SOLUTION ORAL
Qty: 237 | Refills: 0 | Status: ACTIVE | COMMUNITY
Start: 2024-12-26 | End: 1900-01-01

## 2024-12-26 RX ORDER — CEFUROXIME AXETIL 500 MG/1
500 TABLET, FILM COATED ORAL TWICE DAILY
Qty: 20 | Refills: 0 | Status: ACTIVE | COMMUNITY
Start: 2024-12-26 | End: 1900-01-01

## 2025-02-19 ENCOUNTER — APPOINTMENT (OUTPATIENT)
Dept: INTERNAL MEDICINE | Facility: CLINIC | Age: 65
End: 2025-02-19
Payer: COMMERCIAL

## 2025-02-19 VITALS
BODY MASS INDEX: 32.48 KG/M2 | DIASTOLIC BLOOD PRESSURE: 84 MMHG | HEART RATE: 70 BPM | SYSTOLIC BLOOD PRESSURE: 140 MMHG | WEIGHT: 253 LBS

## 2025-02-19 DIAGNOSIS — Z00.00 ENCOUNTER FOR GENERAL ADULT MEDICAL EXAMINATION W/OUT ABNORMAL FINDINGS: ICD-10-CM

## 2025-02-19 DIAGNOSIS — G89.29 DORSALGIA, UNSPECIFIED: ICD-10-CM

## 2025-02-19 DIAGNOSIS — G47.00 INSOMNIA, UNSPECIFIED: ICD-10-CM

## 2025-02-19 DIAGNOSIS — M54.9 DORSALGIA, UNSPECIFIED: ICD-10-CM

## 2025-02-19 PROCEDURE — 99396 PREV VISIT EST AGE 40-64: CPT

## 2025-02-19 PROCEDURE — 36415 COLL VENOUS BLD VENIPUNCTURE: CPT

## 2025-02-20 DIAGNOSIS — E34.9 ENDOCRINE DISORDER, UNSPECIFIED: ICD-10-CM

## 2025-02-20 LAB
ALBUMIN SERPL ELPH-MCNC: 4.7 G/DL
ALP BLD-CCNC: 72 U/L
ALT SERPL-CCNC: 18 U/L
ANION GAP SERPL CALC-SCNC: 10 MMOL/L
AST SERPL-CCNC: 14 U/L
BASOPHILS # BLD AUTO: 0.02 K/UL
BASOPHILS NFR BLD AUTO: 0.3 %
BILIRUB SERPL-MCNC: 1 MG/DL
BUN SERPL-MCNC: 22 MG/DL
CALCIUM SERPL-MCNC: 11.2 MG/DL
CHLORIDE SERPL-SCNC: 107 MMOL/L
CO2 SERPL-SCNC: 26 MMOL/L
CREAT SERPL-MCNC: 1.08 MG/DL
EGFR: 77 ML/MIN/1.73M2
EOSINOPHIL # BLD AUTO: 0.14 K/UL
EOSINOPHIL NFR BLD AUTO: 2.1 %
ESTIMATED AVERAGE GLUCOSE: 123 MG/DL
GLUCOSE SERPL-MCNC: 96 MG/DL
HBA1C MFR BLD HPLC: 5.9 %
HCT VFR BLD CALC: 44.2 %
HGB BLD-MCNC: 14 G/DL
IMM GRANULOCYTES NFR BLD AUTO: 0.3 %
LYMPHOCYTES # BLD AUTO: 1.51 K/UL
LYMPHOCYTES NFR BLD AUTO: 22.8 %
MAN DIFF?: NORMAL
MCHC RBC-ENTMCNC: 28.1 PG
MCHC RBC-ENTMCNC: 31.7 G/DL
MCV RBC AUTO: 88.8 FL
MONOCYTES # BLD AUTO: 0.44 K/UL
MONOCYTES NFR BLD AUTO: 6.6 %
NEUTROPHILS # BLD AUTO: 4.5 K/UL
NEUTROPHILS NFR BLD AUTO: 67.9 %
PLATELET # BLD AUTO: 147 K/UL
POTASSIUM SERPL-SCNC: 4.9 MMOL/L
PROT SERPL-MCNC: 7.1 G/DL
PSA SERPL-MCNC: 1.3 NG/ML
RBC # BLD: 4.98 M/UL
RBC # FLD: 14.5 %
SODIUM SERPL-SCNC: 142 MMOL/L
TSH SERPL-ACNC: 1.82 UIU/ML
WBC # FLD AUTO: 6.63 K/UL

## 2025-02-22 LAB — PARATHYROID HORMONE INTACT: 85 PG/ML

## 2025-03-04 ENCOUNTER — APPOINTMENT (OUTPATIENT)
Dept: GASTROENTEROLOGY | Facility: AMBULATORY MEDICAL SERVICES | Age: 65
End: 2025-03-04
Payer: COMMERCIAL

## 2025-03-04 ENCOUNTER — RESULT REVIEW (OUTPATIENT)
Age: 65
End: 2025-03-04

## 2025-03-04 PROCEDURE — 45380 COLONOSCOPY AND BIOPSY: CPT | Mod: 33

## 2025-06-25 ENCOUNTER — APPOINTMENT (OUTPATIENT)
Dept: INTERNAL MEDICINE | Facility: CLINIC | Age: 65
End: 2025-06-25
Payer: COMMERCIAL

## 2025-06-25 VITALS
DIASTOLIC BLOOD PRESSURE: 80 MMHG | BODY MASS INDEX: 32.21 KG/M2 | HEIGHT: 74 IN | SYSTOLIC BLOOD PRESSURE: 142 MMHG | WEIGHT: 251 LBS

## 2025-06-25 PROCEDURE — 99214 OFFICE O/P EST MOD 30 MIN: CPT

## 2025-06-25 PROCEDURE — G2211 COMPLEX E/M VISIT ADD ON: CPT | Mod: NC

## 2025-06-25 RX ORDER — AMOXICILLIN AND CLAVULANATE POTASSIUM 875; 125 MG/1; MG/1
875-125 TABLET, COATED ORAL
Qty: 14 | Refills: 0 | Status: ACTIVE | COMMUNITY
Start: 2025-06-25 | End: 1900-01-01